# Patient Record
Sex: FEMALE | Race: BLACK OR AFRICAN AMERICAN | Employment: OTHER | URBAN - METROPOLITAN AREA
[De-identification: names, ages, dates, MRNs, and addresses within clinical notes are randomized per-mention and may not be internally consistent; named-entity substitution may affect disease eponyms.]

---

## 2018-02-26 ENCOUNTER — HOSPITAL ENCOUNTER (OUTPATIENT)
Dept: PREADMISSION TESTING | Age: 68
Discharge: HOME | End: 2018-02-26
Payer: MEDICARE

## 2018-02-26 ENCOUNTER — HOSPITAL ENCOUNTER (OUTPATIENT)
Dept: GENERAL RADIOLOGY | Age: 68
Discharge: HOME | End: 2018-02-26
Attending: ORTHOPAEDIC SURGERY
Payer: MEDICARE

## 2018-02-26 DIAGNOSIS — Z01.818 PREOPERATIVE CLEARANCE: ICD-10-CM

## 2018-02-26 LAB
ALBUMIN SERPL-MCNC: 3.7 G/DL (ref 3.4–5)
ALBUMIN/GLOB SERPL: 1 {RATIO} (ref 0.8–1.7)
ALP SERPL-CCNC: 82 U/L (ref 45–117)
ALT SERPL-CCNC: 29 U/L (ref 13–56)
ANION GAP SERPL CALC-SCNC: 10 MMOL/L (ref 3–18)
APPEARANCE UR: CLEAR
AST SERPL-CCNC: 23 U/L (ref 15–37)
BACTERIA SPEC CULT: NORMAL
BILIRUB SERPL-MCNC: 0.6 MG/DL (ref 0.2–1)
BILIRUB UR QL: NEGATIVE
BUN SERPL-MCNC: 33 MG/DL (ref 7–18)
BUN/CREAT SERPL: 21 (ref 12–20)
CALCIUM SERPL-MCNC: 9.9 MG/DL (ref 8.5–10.1)
CHLORIDE SERPL-SCNC: 106 MMOL/L (ref 100–108)
CO2 SERPL-SCNC: 25 MMOL/L (ref 21–32)
COLOR UR: YELLOW
CREAT SERPL-MCNC: 1.56 MG/DL (ref 0.6–1.3)
ERYTHROCYTE [DISTWIDTH] IN BLOOD BY AUTOMATED COUNT: 15.4 % (ref 11.6–14.5)
EST. AVERAGE GLUCOSE BLD GHB EST-MCNC: 140 MG/DL
GLOBULIN SER CALC-MCNC: 3.6 G/DL (ref 2–4)
GLUCOSE SERPL-MCNC: 105 MG/DL (ref 74–99)
GLUCOSE UR STRIP.AUTO-MCNC: NEGATIVE MG/DL
HBA1C MFR BLD: 6.5 % (ref 4.5–5.6)
HCT VFR BLD AUTO: 40 % (ref 35–45)
HGB BLD-MCNC: 12.7 G/DL (ref 12–16)
HGB UR QL STRIP: NEGATIVE
KETONES UR QL STRIP.AUTO: NEGATIVE MG/DL
LEUKOCYTE ESTERASE UR QL STRIP.AUTO: NEGATIVE
MCH RBC QN AUTO: 28.3 PG (ref 24–34)
MCHC RBC AUTO-ENTMCNC: 31.8 G/DL (ref 31–37)
MCV RBC AUTO: 89.3 FL (ref 74–97)
NITRITE UR QL STRIP.AUTO: NEGATIVE
PH UR STRIP: 5 [PH] (ref 5–8)
PLATELET # BLD AUTO: 297 K/UL (ref 135–420)
PMV BLD AUTO: 11 FL (ref 9.2–11.8)
POTASSIUM SERPL-SCNC: 5.3 MMOL/L (ref 3.5–5.5)
PROT SERPL-MCNC: 7.3 G/DL (ref 6.4–8.2)
PROT UR STRIP-MCNC: NEGATIVE MG/DL
RBC # BLD AUTO: 4.48 M/UL (ref 4.2–5.3)
SERVICE CMNT-IMP: NORMAL
SODIUM SERPL-SCNC: 141 MMOL/L (ref 136–145)
SP GR UR REFRACTOMETRY: 1.02 (ref 1–1.03)
UROBILINOGEN UR QL STRIP.AUTO: 1 EU/DL (ref 0.2–1)
WBC # BLD AUTO: 11.7 K/UL (ref 4.6–13.2)

## 2018-02-26 PROCEDURE — 87077 CULTURE AEROBIC IDENTIFY: CPT | Performed by: ORTHOPAEDIC SURGERY

## 2018-02-26 PROCEDURE — 85027 COMPLETE CBC AUTOMATED: CPT | Performed by: ORTHOPAEDIC SURGERY

## 2018-02-26 PROCEDURE — 80053 COMPREHEN METABOLIC PANEL: CPT | Performed by: ORTHOPAEDIC SURGERY

## 2018-02-26 PROCEDURE — 87086 URINE CULTURE/COLONY COUNT: CPT | Performed by: ORTHOPAEDIC SURGERY

## 2018-02-26 PROCEDURE — 87186 SC STD MICRODIL/AGAR DIL: CPT | Performed by: ORTHOPAEDIC SURGERY

## 2018-02-26 PROCEDURE — 83036 HEMOGLOBIN GLYCOSYLATED A1C: CPT | Performed by: ORTHOPAEDIC SURGERY

## 2018-02-26 PROCEDURE — 93005 ELECTROCARDIOGRAM TRACING: CPT

## 2018-02-26 PROCEDURE — 71046 X-RAY EXAM CHEST 2 VIEWS: CPT

## 2018-02-26 PROCEDURE — 87641 MR-STAPH DNA AMP PROBE: CPT | Performed by: ORTHOPAEDIC SURGERY

## 2018-02-26 PROCEDURE — 81003 URINALYSIS AUTO W/O SCOPE: CPT | Performed by: ORTHOPAEDIC SURGERY

## 2018-02-26 PROCEDURE — 36415 COLL VENOUS BLD VENIPUNCTURE: CPT | Performed by: ORTHOPAEDIC SURGERY

## 2018-02-27 LAB
ATRIAL RATE: 84 BPM
CALCULATED P AXIS, ECG09: -4 DEGREES
CALCULATED R AXIS, ECG10: 45 DEGREES
CALCULATED T AXIS, ECG11: 80 DEGREES
DIAGNOSIS, 93000: NORMAL
P-R INTERVAL, ECG05: 144 MS
Q-T INTERVAL, ECG07: 374 MS
QRS DURATION, ECG06: 76 MS
QTC CALCULATION (BEZET), ECG08: 441 MS
VENTRICULAR RATE, ECG03: 84 BPM

## 2018-03-01 LAB
BACTERIA SPEC CULT: ABNORMAL
BACTERIA SPEC CULT: ABNORMAL
SERVICE CMNT-IMP: ABNORMAL

## 2018-03-05 ENCOUNTER — HOSPITAL ENCOUNTER (OUTPATIENT)
Dept: PREADMISSION TESTING | Age: 68
Discharge: HOME | End: 2018-03-05

## 2018-03-05 VITALS — BODY MASS INDEX: 44.1 KG/M2 | HEIGHT: 67 IN | WEIGHT: 281 LBS

## 2018-03-05 RX ORDER — SIMVASTATIN 40 MG/1
40 TABLET, FILM COATED ORAL
COMMUNITY

## 2018-03-05 RX ORDER — COLCHICINE 0.6 MG/1
0.6 TABLET ORAL
COMMUNITY

## 2018-03-05 RX ORDER — ALLOPURINOL 100 MG/1
100 TABLET ORAL 2 TIMES DAILY
COMMUNITY

## 2018-03-05 RX ORDER — GLIPIZIDE 5 MG/1
5 TABLET ORAL DAILY
COMMUNITY

## 2018-03-05 RX ORDER — FUROSEMIDE 20 MG/1
20 TABLET ORAL DAILY
COMMUNITY

## 2018-03-05 RX ORDER — LEVOTHYROXINE SODIUM 200 UG/1
200 TABLET ORAL
COMMUNITY

## 2018-03-05 RX ORDER — ASPIRIN 81 MG/1
81 TABLET ORAL DAILY
COMMUNITY

## 2018-03-05 RX ORDER — ENALAPRIL MALEATE 20 MG/1
20 TABLET ORAL 2 TIMES DAILY
COMMUNITY

## 2018-03-05 RX ORDER — MELATONIN
1000 DAILY
Status: ON HOLD | COMMUNITY
End: 2019-01-21

## 2018-03-05 RX ORDER — CLINDAMYCIN PHOSPHATE 900 MG/50ML
900 INJECTION, SOLUTION INTRAVENOUS ONCE
Status: CANCELLED | OUTPATIENT
Start: 2018-03-05 | End: 2018-03-05

## 2018-03-05 NOTE — PERIOP NOTES
Uses a CPAP and was instructed to bring on admission. Removable- full set of dentures. No or family history of malignant hyperthermia. Care fusion kit and instructions given and reviewed. PCP is aware of the surgery. No participation in clinical trial or research study. Meets criteria for special population. Posting notified. Pt stated she was told to stop aspirin and celebrex 5 days before surgery. Received verbal permission to access Care Everywhere.

## 2018-03-09 ENCOUNTER — ANESTHESIA EVENT (OUTPATIENT)
Dept: SURGERY | Age: 68
DRG: 470 | End: 2018-03-09
Payer: MEDICARE

## 2018-03-12 ENCOUNTER — ANESTHESIA (OUTPATIENT)
Dept: SURGERY | Age: 68
DRG: 470 | End: 2018-03-12
Payer: MEDICARE

## 2018-03-12 ENCOUNTER — HOSPITAL ENCOUNTER (INPATIENT)
Age: 68
LOS: 3 days | Discharge: NURSING, REHAB OR LONG-TERM CARE | DRG: 470 | End: 2018-03-15
Attending: ORTHOPAEDIC SURGERY | Admitting: ORTHOPAEDIC SURGERY
Payer: MEDICARE

## 2018-03-12 ENCOUNTER — APPOINTMENT (OUTPATIENT)
Dept: GENERAL RADIOLOGY | Age: 68
DRG: 470 | End: 2018-03-12
Attending: ORTHOPAEDIC SURGERY
Payer: MEDICARE

## 2018-03-12 DIAGNOSIS — G47.33 OSA (OBSTRUCTIVE SLEEP APNEA): ICD-10-CM

## 2018-03-12 DIAGNOSIS — E66.01 MORBID OBESITY (HCC): ICD-10-CM

## 2018-03-12 DIAGNOSIS — R00.0 TACHYCARDIA: ICD-10-CM

## 2018-03-12 DIAGNOSIS — D62 ACUTE BLOOD LOSS ANEMIA: ICD-10-CM

## 2018-03-12 DIAGNOSIS — M16.11 PRIMARY OSTEOARTHRITIS OF RIGHT HIP: Primary | ICD-10-CM

## 2018-03-12 PROBLEM — Z96.649 S/P HIP REPLACEMENT: Status: ACTIVE | Noted: 2018-03-12

## 2018-03-12 PROBLEM — M16.9 DEGENERATIVE JOINT DISEASE (DJD) OF HIP: Status: ACTIVE | Noted: 2018-03-12

## 2018-03-12 LAB
ANION GAP SERPL CALC-SCNC: 11 MMOL/L (ref 3–18)
BUN SERPL-MCNC: 32 MG/DL (ref 7–18)
BUN/CREAT SERPL: 19 (ref 12–20)
CALCIUM SERPL-MCNC: 8.5 MG/DL (ref 8.5–10.1)
CHLORIDE SERPL-SCNC: 105 MMOL/L (ref 100–108)
CO2 SERPL-SCNC: 21 MMOL/L (ref 21–32)
CREAT SERPL-MCNC: 1.67 MG/DL (ref 0.6–1.3)
EST. AVERAGE GLUCOSE BLD GHB EST-MCNC: 140 MG/DL
GLUCOSE BLD STRIP.AUTO-MCNC: 187 MG/DL (ref 70–110)
GLUCOSE BLD STRIP.AUTO-MCNC: 218 MG/DL (ref 70–110)
GLUCOSE BLD STRIP.AUTO-MCNC: 99 MG/DL (ref 70–110)
GLUCOSE SERPL-MCNC: 192 MG/DL (ref 74–99)
HBA1C MFR BLD: 6.5 % (ref 4.5–5.6)
HCT VFR BLD AUTO: 28.8 % (ref 35–45)
HCT VFR BLD AUTO: 29.7 % (ref 35–45)
HGB BLD-MCNC: 9 G/DL (ref 12–16)
HGB BLD-MCNC: 9 G/DL (ref 12–16)
POTASSIUM SERPL-SCNC: 7 MMOL/L (ref 3.5–5.5)
SODIUM SERPL-SCNC: 137 MMOL/L (ref 136–145)

## 2018-03-12 PROCEDURE — 77030037400 HC ADH TISS HI VISC EXOFIN CHMP -B: Performed by: ORTHOPAEDIC SURGERY

## 2018-03-12 PROCEDURE — 80048 BASIC METABOLIC PNL TOTAL CA: CPT | Performed by: HOSPITALIST

## 2018-03-12 PROCEDURE — C9290 INJ, BUPIVACAINE LIPOSOME: HCPCS | Performed by: ORTHOPAEDIC SURGERY

## 2018-03-12 PROCEDURE — 74011250636 HC RX REV CODE- 250/636: Performed by: ORTHOPAEDIC SURGERY

## 2018-03-12 PROCEDURE — 76010000133 HC OR TIME 3 TO 3.5 HR: Performed by: ORTHOPAEDIC SURGERY

## 2018-03-12 PROCEDURE — 74011636637 HC RX REV CODE- 636/637: Performed by: ANESTHESIOLOGY

## 2018-03-12 PROCEDURE — 77030020262 HC SOL INJ SOD CL 0.9% 100ML: Performed by: ORTHOPAEDIC SURGERY

## 2018-03-12 PROCEDURE — 86920 COMPATIBILITY TEST SPIN: CPT | Performed by: ORTHOPAEDIC SURGERY

## 2018-03-12 PROCEDURE — 74011250636 HC RX REV CODE- 250/636

## 2018-03-12 PROCEDURE — 65270000029 HC RM PRIVATE

## 2018-03-12 PROCEDURE — 77030013079 HC BLNKT BAIR HGGR 3M -A: Performed by: ANESTHESIOLOGY

## 2018-03-12 PROCEDURE — 74011000258 HC RX REV CODE- 258: Performed by: ORTHOPAEDIC SURGERY

## 2018-03-12 PROCEDURE — 77030038010: Performed by: ORTHOPAEDIC SURGERY

## 2018-03-12 PROCEDURE — P9045 ALBUMIN (HUMAN), 5%, 250 ML: HCPCS

## 2018-03-12 PROCEDURE — 74011250636 HC RX REV CODE- 250/636: Performed by: ANESTHESIOLOGY

## 2018-03-12 PROCEDURE — 74011000250 HC RX REV CODE- 250

## 2018-03-12 PROCEDURE — 77030012508 HC MSK AIRWY LMA AMBU -A: Performed by: ANESTHESIOLOGY

## 2018-03-12 PROCEDURE — 0SR904A REPLACEMENT OF RIGHT HIP JOINT WITH CERAMIC ON POLYETHYLENE SYNTHETIC SUBSTITUTE, UNCEMENTED, OPEN APPROACH: ICD-10-PCS | Performed by: ORTHOPAEDIC SURGERY

## 2018-03-12 PROCEDURE — 93005 ELECTROCARDIOGRAM TRACING: CPT

## 2018-03-12 PROCEDURE — 86900 BLOOD TYPING SEROLOGIC ABO: CPT | Performed by: ORTHOPAEDIC SURGERY

## 2018-03-12 PROCEDURE — 77030020255 HC SOL INJ LR 1000ML BG: Performed by: ORTHOPAEDIC SURGERY

## 2018-03-12 PROCEDURE — 76060000037 HC ANESTHESIA 3 TO 3.5 HR: Performed by: ORTHOPAEDIC SURGERY

## 2018-03-12 PROCEDURE — 77030031139 HC SUT VCRL2 J&J -A: Performed by: ORTHOPAEDIC SURGERY

## 2018-03-12 PROCEDURE — 83036 HEMOGLOBIN GLYCOSYLATED A1C: CPT | Performed by: HOSPITALIST

## 2018-03-12 PROCEDURE — 85027 COMPLETE CBC AUTOMATED: CPT | Performed by: HOSPITALIST

## 2018-03-12 PROCEDURE — 77030013708 HC HNDPC SUC IRR PULS STRY –B: Performed by: ORTHOPAEDIC SURGERY

## 2018-03-12 PROCEDURE — 77030011640 HC PAD GRND REM COVD -A: Performed by: ORTHOPAEDIC SURGERY

## 2018-03-12 PROCEDURE — 77030022295 HC SEAL BPLR VSL DISP MEDT -F: Performed by: ORTHOPAEDIC SURGERY

## 2018-03-12 PROCEDURE — 74011250636 HC RX REV CODE- 250/636: Performed by: HOSPITALIST

## 2018-03-12 PROCEDURE — 77030003028 HC SUT VCRL J&J -A: Performed by: ORTHOPAEDIC SURGERY

## 2018-03-12 PROCEDURE — 74011000250 HC RX REV CODE- 250: Performed by: ORTHOPAEDIC SURGERY

## 2018-03-12 PROCEDURE — 76210000019 HC OR PH I REC 4 TO 4.5 HR: Performed by: ORTHOPAEDIC SURGERY

## 2018-03-12 PROCEDURE — 74011250637 HC RX REV CODE- 250/637: Performed by: ANESTHESIOLOGY

## 2018-03-12 PROCEDURE — 77030032490 HC SLV COMPR SCD KNE COVD -B: Performed by: ORTHOPAEDIC SURGERY

## 2018-03-12 PROCEDURE — 77030018846 HC SOL IRR STRL H20 ICUM -A: Performed by: ORTHOPAEDIC SURGERY

## 2018-03-12 PROCEDURE — 77030020782 HC GWN BAIR PAWS FLX 3M -B: Performed by: ORTHOPAEDIC SURGERY

## 2018-03-12 PROCEDURE — 85014 HEMATOCRIT: CPT

## 2018-03-12 PROCEDURE — C1776 JOINT DEVICE (IMPLANTABLE): HCPCS | Performed by: ORTHOPAEDIC SURGERY

## 2018-03-12 PROCEDURE — 77030016154: Performed by: ORTHOPAEDIC SURGERY

## 2018-03-12 PROCEDURE — 74011636637 HC RX REV CODE- 636/637: Performed by: HOSPITALIST

## 2018-03-12 PROCEDURE — 77030005521 HC CATH URETH FOL38 BARD -B: Performed by: ORTHOPAEDIC SURGERY

## 2018-03-12 PROCEDURE — 77030018836 HC SOL IRR NACL ICUM -A: Performed by: ORTHOPAEDIC SURGERY

## 2018-03-12 PROCEDURE — 83735 ASSAY OF MAGNESIUM: CPT | Performed by: HOSPITALIST

## 2018-03-12 PROCEDURE — 77030020269 HC MISC IMPL: Performed by: ORTHOPAEDIC SURGERY

## 2018-03-12 PROCEDURE — 77030008467 HC STPLR SKN COVD -B: Performed by: ORTHOPAEDIC SURGERY

## 2018-03-12 PROCEDURE — 36415 COLL VENOUS BLD VENIPUNCTURE: CPT | Performed by: HOSPITALIST

## 2018-03-12 PROCEDURE — 82962 GLUCOSE BLOOD TEST: CPT

## 2018-03-12 PROCEDURE — 36415 COLL VENOUS BLD VENIPUNCTURE: CPT

## 2018-03-12 PROCEDURE — 77030037875 HC DRSG MEPILEX <16IN BORD MOLN -A: Performed by: ORTHOPAEDIC SURGERY

## 2018-03-12 DEVICE — ELIMINATOR H APEX FOR 48-60MM PINN HIP SHELL: Type: IMPLANTABLE DEVICE | Site: HIP | Status: FUNCTIONAL

## 2018-03-12 DEVICE — COMPONENT TOT HIP PRIMARY CERM ALTRX: Type: IMPLANTABLE DEVICE | Site: HIP | Status: FUNCTIONAL

## 2018-03-12 DEVICE — HEAD FEM DIA36MM +12MM OFFSET 12/14 TAPR HIP CERAMIC BIOLOX: Type: IMPLANTABLE DEVICE | Site: HIP | Status: FUNCTIONAL

## 2018-03-12 DEVICE — SCREW BNE L20MM DIA6.5MM CANC HIP S STL GRIPTION FULL THRD: Type: IMPLANTABLE DEVICE | Site: HIP | Status: FUNCTIONAL

## 2018-03-12 DEVICE — CUP ACET DIA52MM HIP GRIPTION PRI CEMENTLESS FIX SECT SER: Type: IMPLANTABLE DEVICE | Site: HIP | Status: FUNCTIONAL

## 2018-03-12 DEVICE — LINER ACET OD52MM ID36MM HIP ALTRX PINN: Type: IMPLANTABLE DEVICE | Site: HIP | Status: FUNCTIONAL

## 2018-03-12 RX ORDER — FUROSEMIDE 10 MG/ML
40 INJECTION INTRAMUSCULAR; INTRAVENOUS ONCE
Status: COMPLETED | OUTPATIENT
Start: 2018-03-12 | End: 2018-03-12

## 2018-03-12 RX ORDER — SODIUM CHLORIDE 0.9 % (FLUSH) 0.9 %
5-10 SYRINGE (ML) INJECTION AS NEEDED
Status: DISCONTINUED | OUTPATIENT
Start: 2018-03-12 | End: 2018-03-12 | Stop reason: HOSPADM

## 2018-03-12 RX ORDER — INSULIN LISPRO 100 [IU]/ML
INJECTION, SOLUTION INTRAVENOUS; SUBCUTANEOUS
Status: DISCONTINUED | OUTPATIENT
Start: 2018-03-12 | End: 2018-03-15 | Stop reason: HOSPADM

## 2018-03-12 RX ORDER — METOCLOPRAMIDE HYDROCHLORIDE 5 MG/ML
INJECTION INTRAMUSCULAR; INTRAVENOUS AS NEEDED
Status: DISCONTINUED | OUTPATIENT
Start: 2018-03-12 | End: 2018-03-12 | Stop reason: HOSPADM

## 2018-03-12 RX ORDER — ACETAMINOPHEN 10 MG/ML
1000 INJECTION, SOLUTION INTRAVENOUS ONCE
Status: COMPLETED | OUTPATIENT
Start: 2018-03-12 | End: 2018-03-12

## 2018-03-12 RX ORDER — HYDROMORPHONE HYDROCHLORIDE 2 MG/ML
0.5 INJECTION, SOLUTION INTRAMUSCULAR; INTRAVENOUS; SUBCUTANEOUS
Status: DISCONTINUED | OUTPATIENT
Start: 2018-03-12 | End: 2018-03-12 | Stop reason: RX

## 2018-03-12 RX ORDER — HYDROMORPHONE HYDROCHLORIDE 2 MG/ML
INJECTION, SOLUTION INTRAMUSCULAR; INTRAVENOUS; SUBCUTANEOUS AS NEEDED
Status: DISCONTINUED | OUTPATIENT
Start: 2018-03-12 | End: 2018-03-12 | Stop reason: HOSPADM

## 2018-03-12 RX ORDER — CLINDAMYCIN PHOSPHATE 900 MG/50ML
900 INJECTION, SOLUTION INTRAVENOUS ONCE
Status: COMPLETED | OUTPATIENT
Start: 2018-03-12 | End: 2018-03-12

## 2018-03-12 RX ORDER — DEXTROSE 50 % IN WATER (D50W) INTRAVENOUS SYRINGE
25-50 AS NEEDED
Status: DISCONTINUED | OUTPATIENT
Start: 2018-03-12 | End: 2018-03-12 | Stop reason: HOSPADM

## 2018-03-12 RX ORDER — PREGABALIN 50 MG/1
50 CAPSULE ORAL
Status: COMPLETED | OUTPATIENT
Start: 2018-03-12 | End: 2018-03-12

## 2018-03-12 RX ORDER — SODIUM CHLORIDE 9 MG/ML
50 INJECTION, SOLUTION INTRAVENOUS CONTINUOUS
Status: DISCONTINUED | OUTPATIENT
Start: 2018-03-12 | End: 2018-03-15

## 2018-03-12 RX ORDER — PROPOFOL 10 MG/ML
INJECTION, EMULSION INTRAVENOUS AS NEEDED
Status: DISCONTINUED | OUTPATIENT
Start: 2018-03-12 | End: 2018-03-12 | Stop reason: HOSPADM

## 2018-03-12 RX ORDER — INSULIN LISPRO 100 [IU]/ML
INJECTION, SOLUTION INTRAVENOUS; SUBCUTANEOUS ONCE
Status: COMPLETED | OUTPATIENT
Start: 2018-03-12 | End: 2018-03-12

## 2018-03-12 RX ORDER — ALBUMIN HUMAN 50 G/1000ML
SOLUTION INTRAVENOUS AS NEEDED
Status: DISCONTINUED | OUTPATIENT
Start: 2018-03-12 | End: 2018-03-12 | Stop reason: HOSPADM

## 2018-03-12 RX ORDER — MAGNESIUM SULFATE 100 %
4 CRYSTALS MISCELLANEOUS AS NEEDED
Status: DISCONTINUED | OUTPATIENT
Start: 2018-03-12 | End: 2018-03-15 | Stop reason: HOSPADM

## 2018-03-12 RX ORDER — DEXTROSE 50 % IN WATER (D50W) INTRAVENOUS SYRINGE
25-50 AS NEEDED
Status: DISCONTINUED | OUTPATIENT
Start: 2018-03-12 | End: 2018-03-15 | Stop reason: HOSPADM

## 2018-03-12 RX ORDER — HYDROMORPHONE HYDROCHLORIDE 2 MG/ML
0.5 INJECTION, SOLUTION INTRAMUSCULAR; INTRAVENOUS; SUBCUTANEOUS
Status: COMPLETED | OUTPATIENT
Start: 2018-03-12 | End: 2018-03-12

## 2018-03-12 RX ORDER — MIDAZOLAM HYDROCHLORIDE 1 MG/ML
INJECTION, SOLUTION INTRAMUSCULAR; INTRAVENOUS AS NEEDED
Status: DISCONTINUED | OUTPATIENT
Start: 2018-03-12 | End: 2018-03-12 | Stop reason: HOSPADM

## 2018-03-12 RX ORDER — INSULIN GLARGINE 100 [IU]/ML
10 INJECTION, SOLUTION SUBCUTANEOUS
Status: DISCONTINUED | OUTPATIENT
Start: 2018-03-12 | End: 2018-03-15

## 2018-03-12 RX ORDER — ONDANSETRON 2 MG/ML
INJECTION INTRAMUSCULAR; INTRAVENOUS AS NEEDED
Status: DISCONTINUED | OUTPATIENT
Start: 2018-03-12 | End: 2018-03-12 | Stop reason: HOSPADM

## 2018-03-12 RX ORDER — SODIUM CHLORIDE, SODIUM LACTATE, POTASSIUM CHLORIDE, CALCIUM CHLORIDE 600; 310; 30; 20 MG/100ML; MG/100ML; MG/100ML; MG/100ML
125 INJECTION, SOLUTION INTRAVENOUS CONTINUOUS
Status: DISCONTINUED | OUTPATIENT
Start: 2018-03-12 | End: 2018-03-12

## 2018-03-12 RX ORDER — TRANEXAMIC ACID 100 MG/ML
1 INJECTION, SOLUTION INTRAVENOUS
Status: COMPLETED | OUTPATIENT
Start: 2018-03-12 | End: 2018-03-12

## 2018-03-12 RX ORDER — FENTANYL CITRATE 50 UG/ML
INJECTION, SOLUTION INTRAMUSCULAR; INTRAVENOUS AS NEEDED
Status: DISCONTINUED | OUTPATIENT
Start: 2018-03-12 | End: 2018-03-12 | Stop reason: HOSPADM

## 2018-03-12 RX ORDER — LIDOCAINE HYDROCHLORIDE 20 MG/ML
INJECTION, SOLUTION EPIDURAL; INFILTRATION; INTRACAUDAL; PERINEURAL AS NEEDED
Status: DISCONTINUED | OUTPATIENT
Start: 2018-03-12 | End: 2018-03-12 | Stop reason: HOSPADM

## 2018-03-12 RX ORDER — SODIUM CHLORIDE 900 MG/100ML
INJECTION INTRAVENOUS
Status: DISPENSED
Start: 2018-03-12 | End: 2018-03-13

## 2018-03-12 RX ADMIN — FENTANYL CITRATE 25 MCG: 50 INJECTION, SOLUTION INTRAMUSCULAR; INTRAVENOUS at 12:59

## 2018-03-12 RX ADMIN — INSULIN LISPRO 3 UNITS: 100 INJECTION, SOLUTION INTRAVENOUS; SUBCUTANEOUS at 17:06

## 2018-03-12 RX ADMIN — HYDROMORPHONE HYDROCHLORIDE 0.5 MG: 2 INJECTION, SOLUTION INTRAMUSCULAR; INTRAVENOUS; SUBCUTANEOUS at 17:00

## 2018-03-12 RX ADMIN — FENTANYL CITRATE 50 MCG: 50 INJECTION, SOLUTION INTRAMUSCULAR; INTRAVENOUS at 13:04

## 2018-03-12 RX ADMIN — SODIUM CHLORIDE, SODIUM LACTATE, POTASSIUM CHLORIDE, AND CALCIUM CHLORIDE: 600; 310; 30; 20 INJECTION, SOLUTION INTRAVENOUS at 14:10

## 2018-03-12 RX ADMIN — PREGABALIN 50 MG: 50 CAPSULE ORAL at 12:51

## 2018-03-12 RX ADMIN — FENTANYL CITRATE 25 MCG: 50 INJECTION, SOLUTION INTRAMUSCULAR; INTRAVENOUS at 13:47

## 2018-03-12 RX ADMIN — SODIUM CHLORIDE 125 ML/HR: 900 INJECTION, SOLUTION INTRAVENOUS at 22:15

## 2018-03-12 RX ADMIN — SODIUM CHLORIDE 1000 ML: 900 INJECTION, SOLUTION INTRAVENOUS at 20:31

## 2018-03-12 RX ADMIN — FENTANYL CITRATE 25 MCG: 50 INJECTION, SOLUTION INTRAMUSCULAR; INTRAVENOUS at 13:43

## 2018-03-12 RX ADMIN — TRANEXAMIC ACID 1 G: 100 INJECTION, SOLUTION INTRAVENOUS at 13:30

## 2018-03-12 RX ADMIN — HYDROMORPHONE HYDROCHLORIDE 0.5 MG: 2 INJECTION, SOLUTION INTRAMUSCULAR; INTRAVENOUS; SUBCUTANEOUS at 13:54

## 2018-03-12 RX ADMIN — FENTANYL CITRATE 25 MCG: 50 INJECTION, SOLUTION INTRAMUSCULAR; INTRAVENOUS at 13:33

## 2018-03-12 RX ADMIN — ONDANSETRON 4 MG: 2 INJECTION INTRAMUSCULAR; INTRAVENOUS at 13:24

## 2018-03-12 RX ADMIN — INSULIN LISPRO 4 UNITS: 100 INJECTION, SOLUTION INTRAVENOUS; SUBCUTANEOUS at 21:02

## 2018-03-12 RX ADMIN — FENTANYL CITRATE 25 MCG: 50 INJECTION, SOLUTION INTRAMUSCULAR; INTRAVENOUS at 13:19

## 2018-03-12 RX ADMIN — FUROSEMIDE 40 MG: 10 INJECTION, SOLUTION INTRAMUSCULAR; INTRAVENOUS at 20:43

## 2018-03-12 RX ADMIN — LIDOCAINE HYDROCHLORIDE 80 MG: 20 INJECTION, SOLUTION EPIDURAL; INFILTRATION; INTRACAUDAL; PERINEURAL at 12:59

## 2018-03-12 RX ADMIN — ACETAMINOPHEN 1000 MG: 10 INJECTION, SOLUTION INTRAVENOUS at 13:15

## 2018-03-12 RX ADMIN — FENTANYL CITRATE 50 MCG: 50 INJECTION, SOLUTION INTRAMUSCULAR; INTRAVENOUS at 13:28

## 2018-03-12 RX ADMIN — ALBUMIN HUMAN 250 ML: 50 SOLUTION INTRAVENOUS at 14:25

## 2018-03-12 RX ADMIN — FENTANYL CITRATE 25 MCG: 50 INJECTION, SOLUTION INTRAMUSCULAR; INTRAVENOUS at 13:31

## 2018-03-12 RX ADMIN — CLINDAMYCIN PHOSPHATE 900 MG: 900 INJECTION, SOLUTION INTRAVENOUS at 13:05

## 2018-03-12 RX ADMIN — SODIUM CHLORIDE, SODIUM LACTATE, POTASSIUM CHLORIDE, AND CALCIUM CHLORIDE 125 ML/HR: 600; 310; 30; 20 INJECTION, SOLUTION INTRAVENOUS at 16:32

## 2018-03-12 RX ADMIN — HYDROMORPHONE HYDROCHLORIDE 0.5 MG: 2 INJECTION, SOLUTION INTRAMUSCULAR; INTRAVENOUS; SUBCUTANEOUS at 16:47

## 2018-03-12 RX ADMIN — SODIUM CHLORIDE, SODIUM LACTATE, POTASSIUM CHLORIDE, AND CALCIUM CHLORIDE 125 ML/HR: 600; 310; 30; 20 INJECTION, SOLUTION INTRAVENOUS at 12:50

## 2018-03-12 RX ADMIN — HYDROMORPHONE HYDROCHLORIDE 0.5 MG: 2 INJECTION, SOLUTION INTRAMUSCULAR; INTRAVENOUS; SUBCUTANEOUS at 16:54

## 2018-03-12 RX ADMIN — HYDROMORPHONE HYDROCHLORIDE 0.5 MG: 2 INJECTION, SOLUTION INTRAMUSCULAR; INTRAVENOUS; SUBCUTANEOUS at 16:17

## 2018-03-12 RX ADMIN — HYDROMORPHONE HYDROCHLORIDE 0.5 MG: 2 INJECTION, SOLUTION INTRAMUSCULAR; INTRAVENOUS; SUBCUTANEOUS at 17:08

## 2018-03-12 RX ADMIN — ALBUMIN HUMAN 250 ML: 50 SOLUTION INTRAVENOUS at 15:19

## 2018-03-12 RX ADMIN — PROPOFOL 110 MG: 10 INJECTION, EMULSION INTRAVENOUS at 12:59

## 2018-03-12 RX ADMIN — MIDAZOLAM HYDROCHLORIDE 2 MG: 1 INJECTION, SOLUTION INTRAMUSCULAR; INTRAVENOUS at 12:53

## 2018-03-12 RX ADMIN — METOCLOPRAMIDE HYDROCHLORIDE 10 MG: 5 INJECTION INTRAMUSCULAR; INTRAVENOUS at 13:24

## 2018-03-12 RX ADMIN — INSULIN GLARGINE 10 UNITS: 100 INJECTION, SOLUTION SUBCUTANEOUS at 22:37

## 2018-03-12 RX ADMIN — SODIUM CHLORIDE, SODIUM LACTATE, POTASSIUM CHLORIDE, AND CALCIUM CHLORIDE 125 ML/HR: 600; 310; 30; 20 INJECTION, SOLUTION INTRAVENOUS at 17:50

## 2018-03-12 RX ADMIN — HYDROMORPHONE HYDROCHLORIDE 0.5 MG: 2 INJECTION, SOLUTION INTRAMUSCULAR; INTRAVENOUS; SUBCUTANEOUS at 14:02

## 2018-03-12 NOTE — PERIOP NOTES
Dr. Cathy Reed notified about hospitalist consult, she will evaluate patient. Orders received for a stat H&H and EKG.

## 2018-03-12 NOTE — IP AVS SNAPSHOT
[address] [address] 
[phone] Patient: Scott Linton MRN: [id number] BDE:1[birth year]604 About your hospitalization You were admitted on:  March 12, 2018 You last received care in the:  [address] [place] [address] You were discharged on:  March 15, 2018 Why you were hospitalized Your primary diagnosis was:  S/P Hip Replacement Your diagnoses also included:  Degenerative Joint Disease (Djd) Of Hip, Acute Blood Loss Anemia, Tachycardia, Saroj (Obstructive Sleep Apnea), Insulin Dependent Diabetes Mellitus (Hcc), Morbid Obesity (Hcc) Follow-up Information Follow up With Details Comments Contact Info Megan Sadler MD On 3/20/2018 Follow up appointment @ 9:30am [address] [place] [address] 
[phone] Daly Castro MD   [address] [place] [address]  The SNF is responsible for making arrangements for the PCP visit while in house. [facility] [address] [address] 
[phone] Discharge Orders None A check sandrita indicates which time of day the medication should be taken. My Medications START taking these medications Instructions Each Dose to Equal  
 Morning Noon Evening Bedtime  
 acetaminophen 325 mg tablet Commonly known as:  TYLENOL Your last dose was: Your next dose is: Take 2 Tabs by mouth four (4) times daily. 650 mg  
    
   
   
   
  
 enoxaparin 40 mg/0.4 mL Commonly known as:  LOVENOX Your last dose was: Your next dose is: 0.4 mL by SubCUTAneous route daily. 40 mg  
    
   
   
   
  
 oxyCODONE IR 5 mg immediate release tablet Commonly known as:  Kathi Render Your last dose was: Your next dose is: Take 1 Tab by mouth every four (4) hours as needed. Max Daily Amount: 30 mg.  
 5 mg traMADol 50 mg tablet Commonly known as:  ULTRAM  
   
Your last dose was: Your next dose is: Take 1 Tab by mouth four (4) times daily. Max Daily Amount: 200 mg.  
 50 mg CONTINUE taking these medications Instructions Each Dose to Equal  
 Morning Noon Evening Bedtime  
 allopurinol 100 mg tablet Commonly known as:  Vivien Govea Your last dose was: Your next dose is: Take 100 mg by mouth two (2) times a day. Indications: prevention of acute gout attack 100 mg  
    
   
   
   
  
 aspirin delayed-release 81 mg tablet Your last dose was: Your next dose is: Take 81 mg by mouth daily. 81 mg  
    
   
   
   
  
 cholecalciferol 1,000 unit tablet Commonly known as:  VITAMIN D3 Your last dose was: Your next dose is: Take 1,000 Units by mouth daily. 1000 Units  
    
   
   
   
  
 colchicine 0.6 mg tablet Your last dose was: Your next dose is: Take 0.6 mg by mouth two (2) times daily as needed. Indications: prevention of acute gout attack 0.6 mg  
    
   
   
   
  
 enalapril 20 mg tablet Commonly known as:  Mandeep Layne Your last dose was: Your next dose is: Take 20 mg by mouth two (2) times a day. Indications: hypertension 20 mg  
    
   
   
   
  
 furosemide 20 mg tablet Commonly known as:  LASIX Your last dose was: Your next dose is: Take 20 mg by mouth daily. Indications: Edema 20 mg  
    
   
   
   
  
 glipiZIDE 5 mg tablet Commonly known as:  Arnold Hodgkin Your last dose was: Your next dose is: Take 5 mg by mouth daily. Indications: type 2 diabetes mellitus 5 mg  
    
   
   
   
  
 insulin detemir U-100 100 unit/mL (3 mL) Inpn Commonly known as:  Tasha Kraus Your last dose was: Your next dose is: 35 Units by SubCUTAneous route nightly. Indications: type 2 diabetes mellitus 35 Units  
    
   
   
   
  
 levothyroxine 200 mcg tablet Commonly known as:  SYNTHROID Your last dose was: Your next dose is: Take 200 mcg by mouth Daily (before breakfast). Indications: hypothyroidism 200 mcg Liraglutide 0.6 mg/0.1 mL (18 mg/3 mL) Pnij Commonly known as:  Ambrocio Him Your last dose was: Your next dose is:    
   
   
 1.8 mg by SubCUTAneous route daily. Indications: type 2 diabetes mellitus 1.8 mg  
    
   
   
   
  
 simvastatin 40 mg tablet Commonly known as:  ZOCOR Your last dose was: Your next dose is: Take 40 mg by mouth nightly. Indications: hypercholesterolemia 40 mg Where to Get Your Medications Information on where to get these meds will be given to you by the nurse or doctor. ! Ask your nurse or doctor about these medications  
  acetaminophen 325 mg tablet  
 enoxaparin 40 mg/0.4 mL  
 oxyCODONE IR 5 mg immediate release tablet  
 traMADol 50 mg tablet Discharge Instructions DISCHARGE SUMMARY from Nurse PATIENT INSTRUCTIONS: 
 
 
F-face looks uneven A-arms unable to move or move unevenly S-speech slurred or non-existent T-time-call 911 as soon as signs and symptoms begin-DO NOT go Back to bed or wait to see if you get better-TIME IS BRAIN. Warning Signs of HEART ATTACK Call 911 if you have these symptoms: 
? Chest discomfort. Most heart attacks involve discomfort in the center of the chest that lasts more than a few minutes, or that goes away and comes back. It can feel like uncomfortable pressure, squeezing, fullness, or pain. ? Discomfort in other areas of the upper body. Symptoms can include pain or discomfort in one or both arms, the back, neck, jaw, or stomach. ? Shortness of breath with or without chest discomfort. ? Other signs may include breaking out in a cold sweat, nausea, or lightheadedness. Don't wait more than five minutes to call [address]! Fast action can save your life. Calling 911 is almost always the fastest way to get lifesaving treatment. Emergency Medical Services staff can begin treatment when they arrive  up to an hour sooner than if someone gets to the hospital by car. The discharge information has been reviewed with the patient. The patient verbalized understanding. Discharge medications reviewed with the patient and appropriate educational materials and side effects teaching were provided. ___________________________________________________________________________________________________________________________________ Lab Results Component Value Date/Time Hemoglobin A1c 6.6 (H) 03/13/2018 03:22 AM  
 
An A1C of 5.7-6.4% meets the criteria for pre-diabetes; an A1C of 6.5% or higher meets the criteria for diabetes. This lab test reflects that your blood sugar averaged 143 mg/dL  over the past 3 months. It is important to follow up with your provider on a routine basis to continue to evaluate your blood sugar and discuss any necessary changes in treatment. Patient armband removed and shredded MyChart Announcement We are excited to announce that we are making your provider's discharge notes available to you in [employer]. You will see these notes when they are completed and signed by the physician that discharged you from your recent hospital stay.   If you have any questions or concerns about any information you see in [employer], please call the Health Information Department where you were seen or reach out to your Primary Care Provider for more information about your plan of care. Introducing [facility]! Jose Hughes introduces [employer] patient portal. Now you can access parts of your medical record, email your doctor's office, and request medication refills online. 1. In your internet browser, go to [email or web address] 2. Click on the First Time User? Click Here link in the Sign In box. You will see the New Member Sign Up page. 3. Enter your [employer] Access Code exactly as it appears below. You will not need to use this code after youve completed the sign-up process. If you do not sign up before the expiration date, you must request a new code. · [employer] Access Code: [email or web address] Expires: 5/27/2018  1:47 PM 
 
4. Enter the last four digits of your Social Security Number (xxxx) and Date of Birth (mm/dd/yyyy) as indicated and click Submit. You will be taken to the next sign-up page. 5. Create a Wikisway ID. This will be your Wikisway login ID and cannot be changed, so think of one that is secure and easy to remember. 6. Create a [employer] password. You can change your password at any time. 7. Enter your Password Reset Question and Answer. This can be used at a later time if you forget your password. 8. Enter your e-mail address. You will receive e-mail notification when new information is available in [address] 9. Click Sign Up. You can now view and download portions of your medical record. 10. Click the Download Summary menu link to download a portable copy of your medical information. If you have questions, please visit the Frequently Asked Questions section of the [employer] website. Remember, [employer] is NOT to be used for urgent needs. For medical emergencies, dial 911. Now available from your iPhone and Android! Unresulted Labs-Please follow up with your PCP about these lab tests Order Current Status EKG, 12 LEAD, INITIAL Preliminary result URINALYSIS W/MICROSCOPIC Preliminary result Providers Seen During Your Hospitalization Provider Specialty Primary office phone Nahomi Prakash MD Orthopedic Surgery [phone] Your Primary Care Physician (PCP) Primary Care Physician Office Phone Office Fax Marielle Argueta [phone] You are allergic to the following Allergen Reactions Ampicillin Hives Can take PCN Recent Documentation Height Weight BMI OB Status Smoking Status 1.702 m 132.5 kg 45.75 kg/m2 Hysterectomy Former Smoker Emergency Contacts Name Discharge Info Relation Home Work Mobile J Luis Ybarra DISCHARGE CAREGIVER [3] Son [22]   [phone] Patient Belongings The following personal items are in your possession at time of discharge: 
  Dental Appliances: Lowers, Uppers (WITH sonClaudio)  Visual Aid: None          Jewelry: None  Clothing: Pants, Shirt, Footwear, Undergarments, Jacket/Coat (WITH Choco. Raf Ghosh)    Other Valuables:  (son)  Personal Items Sent to Safe:  (cpap at bedside) Please provide this summary of care documentation to your next provider. Signatures-by signing, you are acknowledging that this After Visit Summary has been reviewed with you and you have received a copy. Patient Signature:  ____________________________________________________________ Date:  ____________________________________________________________  
  
Kellie Godfrey Provider Signature:  ____________________________________________________________ Date:  ____________________________________________________________

## 2018-03-12 NOTE — PERIOP NOTES
Family has been updated and given inpatient room #213. Receiving unit Moise Burns) notified that SBAR is available for review.

## 2018-03-12 NOTE — PERIOP NOTES
Notified  patient continues to be sinus tachycardic, patient sleeping comfortably with pain under control. BP's have stabilized, H/H repeat per flowsheet. Aware of EBL. On 4th bag of IVF, no need to void. She said to continue to monitor and continue to give IVF, let IVF infuse with clamp open. Read back and verified. Will continue to monitor.

## 2018-03-12 NOTE — H&P
History and Physical        Patient: Layne Doan               Sex: female          DOA: 3/12/2018         YOB: 1950      Age:  69 y.o.        LOS:  LOS: 0 days        HPI:     Layne Doan is a 69 y.o. female who has right hip DJD has failed non-op therapy    Past Medical History:   Diagnosis Date    Cancer [facility]) 2005    thyroid    Chronic kidney disease     stage 3    Diabetes ([address] 1990's    Gout     Hypercholesterolemia     Hypertension 1990's    Liver disease 1990's    Hep C (cured)    OA (osteoarthritis)     Sleep apnea     uses CPAP    Thyroid disease 2005    cancer       Past Surgical History:   Procedure Laterality Date    HX BREAST LUMPECTOMY Right     X 2    HX HEENT  2005    total thyroidectomy    HX HYSTERECTOMY      BSO    HX KNEE REPLACEMENT Left 2000    HX OPEN CHOLECYSTECTOMY      HX OTHER SURGICAL      hemorrhoidectomy       History reviewed. No pertinent family history. Social History     Social History    Marital status:      Spouse name: N/A    Number of children: N/A    Years of education: N/A     Social History Main Topics    Smoking status: Former Smoker     Quit date: 10/16/2016    Smokeless tobacco: Never Used    Alcohol use Yes      Comment: once every 3 months    Drug use: No    Sexual activity: Not Asked     Other Topics Concern    None     Social History Narrative       Prior to Admission medications    Medication Sig Start Date End Date Taking? Authorizing Provider   allopurinol (ZYLOPRIM) 100 mg tablet Take 100 mg by mouth two (2) times a day. Indications: prevention of acute gout attack   Yes Historical Provider   cholecalciferol (VITAMIN D3) 1,000 unit tablet Take 1,000 Units by mouth daily. Yes Historical Provider   enalapril (VASOTEC) 20 mg tablet Take 20 mg by mouth two (2) times a day. Indications: hypertension   Yes Historical Provider   furosemide (LASIX) 20 mg tablet Take 20 mg by mouth daily.  Indications: Edema Yes Historical Provider   glipiZIDE (GLUCOTROL) 5 mg tablet Take 5 mg by mouth daily. Indications: type 2 diabetes mellitus   Yes Historical Provider   insulin detemir U-100 (LEVEMIR FLEXTOUCH) 100 unit/mL (3 mL) inpn 35 Units by SubCUTAneous route nightly. Indications: type 2 diabetes mellitus   Yes Historical Provider   levothyroxine (SYNTHROID) 200 mcg tablet Take 200 mcg by mouth Daily (before breakfast). Indications: hypothyroidism   Yes Historical Provider   Liraglutide (VICTOZA) 0.6 mg/0.1 mL (18 mg/3 mL) pnij 1.8 mg by SubCUTAneous route daily. Indications: type 2 diabetes mellitus   Yes Historical Provider   simvastatin (ZOCOR) 40 mg tablet Take 40 mg by mouth nightly. Indications: hypercholesterolemia   Yes Historical Provider   aspirin delayed-release 81 mg tablet Take 81 mg by mouth daily. Historical Provider   colchicine 0.6 mg tablet Take 0.6 mg by mouth two (2) times daily as needed. Indications: prevention of acute gout attack    Historical Provider       Allergies   Allergen Reactions    Ampicillin Hives     Can take PCN       Review of Systems  Pertinent items are noted in the History of Present Illness. Physical Exam:      Visit Vitals    /78 (BP 1 Location: Left arm, BP Patient Position: At rest;Pre-activity; Sitting)    Pulse 86    Temp 96.9 °F (36.1 °C)    Resp 16    Ht 5' 7\" (1.702 m)    Wt 128.2 kg (282 lb 9 oz)    SpO2 100%    BMI 44.26 kg/m2       Physical Exam:  Physical Exam:   General:  Alert, cooperative, no distress, appears stated age. Eyes:  Conjunctivae/corneas clear. PERRL, EOMs intact. Fundi benign   Ears:  Normal TMs and external ear canals both ears. Nose: Nares normal. Septum midline. Mucosa normal. No drainage or sinus tenderness. Mouth/Throat: Lips, mucosa, and tongue normal. Teeth and gums normal.   Neck: Supple, symmetrical, trachea midline, no adenopathy, thyroid: no enlargement/tenderness/nodules, no carotid bruit and no JVD.    Back: Symmetric, no curvature. ROM normal. No CVA tenderness. Lungs:   Clear to auscultation bilaterally. Heart:  Regular rate and rhythm, S1, S2 normal, no murmur, click, rub or gallop. Abdomen:   Soft, non-tender. Bowel sounds normal. No masses,  No organomegaly. Extremities: Extremities normal, atraumatic, no cyanosis or edema. Right hip pain with ROM   Pulses: 2+ and symmetric all extremities. Skin: Skin color, texture, turgor normal. No rashes or lesions   Lymph nodes: Cervical, supraclavicular, and axillary nodes normal.   Neurologic: CNII-XII intact. Normal strength, sensation and reflexes throughout. Labs Reviewed: All lab results for the last 24 hours reviewed.     Assessment/Plan     Active Problems:    Degenerative joint disease (DJD) of hip (3/12/2018)        Right THR

## 2018-03-12 NOTE — BRIEF OP NOTE
BRIEF OPERATIVE NOTE    Date of Procedure: 3/12/2018   Preoperative Diagnosis: RIGHT HIP DJD  Postoperative Diagnosis: same   Procedure(s):  RIGHT ANTERIOR HIP REPLACEMENT WITH C-ARM **SPEC POP**   Surgeon(s) and Role:     * Lacy Rodriguez MD - Primary         Assistant Staff: None      Surgical Staff:  Circ-1: Larance Schlatter, RN  Scrub Tech-1: Thi Ortiz  Scrub RN-1: Claudean Catching No case tracking events are documented in the log.   Anesthesia: General   Estimated Blood Loss: 50  Specimens: * No specimens in log *   Findings: djd   Complications: none  Implants: * No implants in log *

## 2018-03-12 NOTE — PERIOP NOTES
Reviewed PTA medication list with patient/caregiver and patient/caregiver denies any additional medications. Patient admits to having a responsible adult care for them for at least 24 hours after surgery. Pt has question about consent- communicated with holding nurse, Zayra Josue. No success after multiple attempts on iV placement- will let anesthesiologist knows.  a

## 2018-03-12 NOTE — CDMP QUERY
Patient is noted to have a BMI of 44.26. Please clarify if this patient is:     =>Morbidly obese (BMI ³ 40)  =>Obese (BMI 30 - 39.9)  =>Overweight (BMI 25 - 29.9)  =>Other explanation of clinical findings  =>Unable to determine (no explanation for clinical findings)    Presentation: 5'7\", 282 lbs = BMI 44.26    REFERENCE:  The [address] [place] [address] has issued a statement indicating that, \"Individuals who are overweight, obese, or morbidly obese are at an increased risk for certain medical conditions when compared to persons of normal weight. Therefore, these conditions are always clinically significant and reportable when documented by the provider. Please clarify and document your clinical opinion in the progress notes and discharge summary, including the definitive and or presumptive diagnosis, (suspected or probable), related to the above clinical findings. Please include clinical findings supporting your diagnosis.      Thank you,  Tiffany Sarabia RN, CCDS

## 2018-03-12 NOTE — PERIOP NOTES
TRANSFER - IN REPORT:    Verbal report received from ORN and CRNA (name) on Aleyda Mini  being received from OR (unit) for routine progression of care      Report consisted of patients Situation, Background, Assessment and   Recommendations(SBAR). Information from the following report(s) SBAR, Kardex, OR Summary, Procedure Summary, Intake/Output, MAR and Recent Results was reviewed with the receiving nurse. Opportunity for questions and clarification was provided. Assessment completed upon patients arrival to unit and care assumed.

## 2018-03-13 LAB
ANION GAP SERPL CALC-SCNC: 9 MMOL/L (ref 3–18)
ANION GAP SERPL CALC-SCNC: 9 MMOL/L (ref 3–18)
BUN SERPL-MCNC: 29 MG/DL (ref 7–18)
BUN SERPL-MCNC: 30 MG/DL (ref 7–18)
BUN/CREAT SERPL: 17 (ref 12–20)
BUN/CREAT SERPL: 17 (ref 12–20)
CALCIUM SERPL-MCNC: 8 MG/DL (ref 8.5–10.1)
CALCIUM SERPL-MCNC: 8.1 MG/DL (ref 8.5–10.1)
CHLORIDE SERPL-SCNC: 106 MMOL/L (ref 100–108)
CHLORIDE SERPL-SCNC: 108 MMOL/L (ref 100–108)
CO2 SERPL-SCNC: 23 MMOL/L (ref 21–32)
CO2 SERPL-SCNC: 25 MMOL/L (ref 21–32)
CREAT SERPL-MCNC: 1.74 MG/DL (ref 0.6–1.3)
CREAT SERPL-MCNC: 1.79 MG/DL (ref 0.6–1.3)
ERYTHROCYTE [DISTWIDTH] IN BLOOD BY AUTOMATED COUNT: 15.6 % (ref 11.6–14.5)
EST. AVERAGE GLUCOSE BLD GHB EST-MCNC: 143 MG/DL
GLUCOSE BLD STRIP.AUTO-MCNC: 149 MG/DL (ref 70–110)
GLUCOSE BLD STRIP.AUTO-MCNC: 167 MG/DL (ref 70–110)
GLUCOSE BLD STRIP.AUTO-MCNC: 201 MG/DL (ref 70–110)
GLUCOSE BLD STRIP.AUTO-MCNC: 262 MG/DL (ref 70–110)
GLUCOSE SERPL-MCNC: 178 MG/DL (ref 74–99)
GLUCOSE SERPL-MCNC: 185 MG/DL (ref 74–99)
HBA1C MFR BLD: 6.6 % (ref 4.5–5.6)
HCT VFR BLD AUTO: 25.7 % (ref 35–45)
HCT VFR BLD AUTO: 26.1 % (ref 35–45)
HCT VFR BLD AUTO: 27.3 % (ref 35–45)
HGB BLD-MCNC: 7.9 G/DL (ref 12–16)
HGB BLD-MCNC: 8.3 G/DL (ref 12–16)
HGB BLD-MCNC: 8.5 G/DL (ref 12–16)
MAGNESIUM SERPL-MCNC: 1.6 MG/DL (ref 1.6–2.6)
MCH RBC QN AUTO: 27.8 PG (ref 24–34)
MCHC RBC AUTO-ENTMCNC: 30.4 G/DL (ref 31–37)
MCV RBC AUTO: 91.3 FL (ref 74–97)
PLATELET # BLD AUTO: 185 K/UL (ref 135–420)
PMV BLD AUTO: 10.1 FL (ref 9.2–11.8)
POTASSIUM SERPL-SCNC: 5.4 MMOL/L (ref 3.5–5.5)
POTASSIUM SERPL-SCNC: 5.7 MMOL/L (ref 3.5–5.5)
RBC # BLD AUTO: 2.99 M/UL (ref 4.2–5.3)
SODIUM SERPL-SCNC: 140 MMOL/L (ref 136–145)
SODIUM SERPL-SCNC: 140 MMOL/L (ref 136–145)
WBC # BLD AUTO: 10.8 K/UL (ref 4.6–13.2)

## 2018-03-13 PROCEDURE — 97530 THERAPEUTIC ACTIVITIES: CPT

## 2018-03-13 PROCEDURE — 97162 PT EVAL MOD COMPLEX 30 MIN: CPT

## 2018-03-13 PROCEDURE — 74011250636 HC RX REV CODE- 250/636: Performed by: HOSPITALIST

## 2018-03-13 PROCEDURE — 74011250636 HC RX REV CODE- 250/636: Performed by: ORTHOPAEDIC SURGERY

## 2018-03-13 PROCEDURE — 65660000000 HC RM CCU STEPDOWN

## 2018-03-13 PROCEDURE — 85018 HEMOGLOBIN: CPT | Performed by: ORTHOPAEDIC SURGERY

## 2018-03-13 PROCEDURE — 83036 HEMOGLOBIN GLYCOSYLATED A1C: CPT | Performed by: ORTHOPAEDIC SURGERY

## 2018-03-13 PROCEDURE — 74011636637 HC RX REV CODE- 636/637: Performed by: HOSPITALIST

## 2018-03-13 PROCEDURE — P9016 RBC LEUKOCYTES REDUCED: HCPCS | Performed by: ORTHOPAEDIC SURGERY

## 2018-03-13 PROCEDURE — 82962 GLUCOSE BLOOD TEST: CPT

## 2018-03-13 PROCEDURE — 30233N1 TRANSFUSION OF NONAUTOLOGOUS RED BLOOD CELLS INTO PERIPHERAL VEIN, PERCUTANEOUS APPROACH: ICD-10-PCS | Performed by: ORTHOPAEDIC SURGERY

## 2018-03-13 PROCEDURE — 74011250637 HC RX REV CODE- 250/637: Performed by: ORTHOPAEDIC SURGERY

## 2018-03-13 PROCEDURE — 74011636637 HC RX REV CODE- 636/637: Performed by: PHYSICIAN ASSISTANT

## 2018-03-13 PROCEDURE — 36430 TRANSFUSION BLD/BLD COMPNT: CPT

## 2018-03-13 PROCEDURE — 97110 THERAPEUTIC EXERCISES: CPT

## 2018-03-13 PROCEDURE — 80048 BASIC METABOLIC PNL TOTAL CA: CPT | Performed by: ORTHOPAEDIC SURGERY

## 2018-03-13 RX ORDER — ENALAPRIL MALEATE 10 MG/1
20 TABLET ORAL 2 TIMES DAILY
Status: DISCONTINUED | OUTPATIENT
Start: 2018-03-13 | End: 2018-03-13

## 2018-03-13 RX ORDER — CLINDAMYCIN PHOSPHATE 900 MG/50ML
900 INJECTION, SOLUTION INTRAVENOUS EVERY 8 HOURS
Status: COMPLETED | OUTPATIENT
Start: 2018-03-13 | End: 2018-03-13

## 2018-03-13 RX ORDER — INSULIN LISPRO 100 [IU]/ML
INJECTION, SOLUTION INTRAVENOUS; SUBCUTANEOUS ONCE
Status: DISCONTINUED | OUTPATIENT
Start: 2018-03-13 | End: 2018-03-13 | Stop reason: SDUPTHER

## 2018-03-13 RX ORDER — SIMVASTATIN 40 MG/1
40 TABLET, FILM COATED ORAL
Status: DISCONTINUED | OUTPATIENT
Start: 2018-03-13 | End: 2018-03-15 | Stop reason: HOSPADM

## 2018-03-13 RX ORDER — INSULIN LISPRO 100 [IU]/ML
INJECTION, SOLUTION INTRAVENOUS; SUBCUTANEOUS
Status: DISCONTINUED | OUTPATIENT
Start: 2018-03-13 | End: 2018-03-13 | Stop reason: SDUPTHER

## 2018-03-13 RX ORDER — DEXTROSE 50 % IN WATER (D50W) INTRAVENOUS SYRINGE
25-50 AS NEEDED
Status: DISCONTINUED | OUTPATIENT
Start: 2018-03-13 | End: 2018-03-15 | Stop reason: HOSPADM

## 2018-03-13 RX ORDER — COLCHICINE 0.6 MG/1
0.6 TABLET ORAL
Status: DISCONTINUED | OUTPATIENT
Start: 2018-03-13 | End: 2018-03-15 | Stop reason: HOSPADM

## 2018-03-13 RX ORDER — NALOXONE HYDROCHLORIDE 0.4 MG/ML
0.4 INJECTION, SOLUTION INTRAMUSCULAR; INTRAVENOUS; SUBCUTANEOUS AS NEEDED
Status: DISCONTINUED | OUTPATIENT
Start: 2018-03-13 | End: 2018-03-15 | Stop reason: HOSPADM

## 2018-03-13 RX ORDER — INSULIN LISPRO 100 [IU]/ML
6 INJECTION, SOLUTION INTRAVENOUS; SUBCUTANEOUS
Status: DISCONTINUED | OUTPATIENT
Start: 2018-03-13 | End: 2018-03-15

## 2018-03-13 RX ORDER — DIPHENHYDRAMINE HCL 25 MG
25 CAPSULE ORAL
Status: DISCONTINUED | OUTPATIENT
Start: 2018-03-13 | End: 2018-03-15 | Stop reason: HOSPADM

## 2018-03-13 RX ORDER — TRAMADOL HYDROCHLORIDE 50 MG/1
50 TABLET ORAL 4 TIMES DAILY
Status: DISCONTINUED | OUTPATIENT
Start: 2018-03-13 | End: 2018-03-15 | Stop reason: HOSPADM

## 2018-03-13 RX ORDER — SODIUM CHLORIDE 9 MG/ML
250 INJECTION, SOLUTION INTRAVENOUS AS NEEDED
Status: DISCONTINUED | OUTPATIENT
Start: 2018-03-13 | End: 2018-03-15 | Stop reason: HOSPADM

## 2018-03-13 RX ORDER — ONDANSETRON 2 MG/ML
4 INJECTION INTRAMUSCULAR; INTRAVENOUS
Status: DISCONTINUED | OUTPATIENT
Start: 2018-03-13 | End: 2018-03-15 | Stop reason: HOSPADM

## 2018-03-13 RX ORDER — ALLOPURINOL 100 MG/1
100 TABLET ORAL 2 TIMES DAILY
Status: DISCONTINUED | OUTPATIENT
Start: 2018-03-13 | End: 2018-03-15 | Stop reason: HOSPADM

## 2018-03-13 RX ORDER — ZOLPIDEM TARTRATE 5 MG/1
5 TABLET ORAL
Status: DISCONTINUED | OUTPATIENT
Start: 2018-03-13 | End: 2018-03-15 | Stop reason: HOSPADM

## 2018-03-13 RX ORDER — SODIUM CHLORIDE, SODIUM LACTATE, POTASSIUM CHLORIDE, CALCIUM CHLORIDE 600; 310; 30; 20 MG/100ML; MG/100ML; MG/100ML; MG/100ML
75 INJECTION, SOLUTION INTRAVENOUS CONTINUOUS
Status: DISCONTINUED | OUTPATIENT
Start: 2018-03-13 | End: 2018-03-13

## 2018-03-13 RX ORDER — MELATONIN
1000 DAILY
Status: DISCONTINUED | OUTPATIENT
Start: 2018-03-13 | End: 2018-03-15 | Stop reason: HOSPADM

## 2018-03-13 RX ORDER — LEVOTHYROXINE SODIUM 200 UG/1
200 TABLET ORAL
Status: DISCONTINUED | OUTPATIENT
Start: 2018-03-13 | End: 2018-03-15 | Stop reason: HOSPADM

## 2018-03-13 RX ORDER — DOCUSATE SODIUM 100 MG/1
100 CAPSULE, LIQUID FILLED ORAL 2 TIMES DAILY
Status: DISCONTINUED | OUTPATIENT
Start: 2018-03-13 | End: 2018-03-15 | Stop reason: HOSPADM

## 2018-03-13 RX ORDER — SODIUM CHLORIDE 0.9 % (FLUSH) 0.9 %
5-10 SYRINGE (ML) INJECTION EVERY 8 HOURS
Status: DISCONTINUED | OUTPATIENT
Start: 2018-03-13 | End: 2018-03-15 | Stop reason: HOSPADM

## 2018-03-13 RX ORDER — HYDROMORPHONE HYDROCHLORIDE 2 MG/ML
1 INJECTION, SOLUTION INTRAMUSCULAR; INTRAVENOUS; SUBCUTANEOUS
Status: DISCONTINUED | OUTPATIENT
Start: 2018-03-13 | End: 2018-03-15 | Stop reason: HOSPADM

## 2018-03-13 RX ORDER — SODIUM CHLORIDE 0.9 % (FLUSH) 0.9 %
5-10 SYRINGE (ML) INJECTION AS NEEDED
Status: DISCONTINUED | OUTPATIENT
Start: 2018-03-13 | End: 2018-03-15 | Stop reason: HOSPADM

## 2018-03-13 RX ORDER — ENOXAPARIN SODIUM 100 MG/ML
40 INJECTION SUBCUTANEOUS DAILY
Status: DISCONTINUED | OUTPATIENT
Start: 2018-03-13 | End: 2018-03-15 | Stop reason: HOSPADM

## 2018-03-13 RX ORDER — FUROSEMIDE 20 MG/1
20 TABLET ORAL DAILY
Status: DISCONTINUED | OUTPATIENT
Start: 2018-03-13 | End: 2018-03-15 | Stop reason: HOSPADM

## 2018-03-13 RX ORDER — ACETAMINOPHEN 325 MG/1
650 TABLET ORAL 4 TIMES DAILY
Status: DISCONTINUED | OUTPATIENT
Start: 2018-03-13 | End: 2018-03-15 | Stop reason: HOSPADM

## 2018-03-13 RX ORDER — OXYCODONE HYDROCHLORIDE 5 MG/1
5 TABLET ORAL
Status: DISCONTINUED | OUTPATIENT
Start: 2018-03-13 | End: 2018-03-15 | Stop reason: HOSPADM

## 2018-03-13 RX ORDER — DIPHENHYDRAMINE HYDROCHLORIDE 50 MG/ML
12.5 INJECTION, SOLUTION INTRAMUSCULAR; INTRAVENOUS
Status: DISCONTINUED | OUTPATIENT
Start: 2018-03-13 | End: 2018-03-15 | Stop reason: HOSPADM

## 2018-03-13 RX ORDER — MAGNESIUM SULFATE 100 %
4 CRYSTALS MISCELLANEOUS AS NEEDED
Status: DISCONTINUED | OUTPATIENT
Start: 2018-03-13 | End: 2018-03-15 | Stop reason: HOSPADM

## 2018-03-13 RX ORDER — KETOROLAC TROMETHAMINE 15 MG/ML
15 INJECTION, SOLUTION INTRAMUSCULAR; INTRAVENOUS EVERY 6 HOURS
Status: DISCONTINUED | OUTPATIENT
Start: 2018-03-13 | End: 2018-03-13

## 2018-03-13 RX ADMIN — ACETAMINOPHEN 650 MG: 325 TABLET ORAL at 08:58

## 2018-03-13 RX ADMIN — INSULIN LISPRO 6 UNITS: 100 INJECTION, SOLUTION INTRAVENOUS; SUBCUTANEOUS at 17:57

## 2018-03-13 RX ADMIN — VITAMIN D, TAB 1000IU (100/BT) 1000 UNITS: 25 TAB at 08:57

## 2018-03-13 RX ADMIN — ENOXAPARIN SODIUM 40 MG: 40 INJECTION SUBCUTANEOUS at 08:59

## 2018-03-13 RX ADMIN — DOCUSATE SODIUM 100 MG: 100 CAPSULE, LIQUID FILLED ORAL at 20:58

## 2018-03-13 RX ADMIN — DOCUSATE SODIUM 100 MG: 100 CAPSULE, LIQUID FILLED ORAL at 00:42

## 2018-03-13 RX ADMIN — Medication 10 ML: at 16:49

## 2018-03-13 RX ADMIN — INSULIN LISPRO 6 UNITS: 100 INJECTION, SOLUTION INTRAVENOUS; SUBCUTANEOUS at 12:21

## 2018-03-13 RX ADMIN — CLINDAMYCIN PHOSPHATE 900 MG: 900 INJECTION, SOLUTION INTRAVENOUS at 12:45

## 2018-03-13 RX ADMIN — SODIUM CHLORIDE 1000 ML: 900 INJECTION, SOLUTION INTRAVENOUS at 06:00

## 2018-03-13 RX ADMIN — DOCUSATE SODIUM 100 MG: 100 CAPSULE, LIQUID FILLED ORAL at 08:59

## 2018-03-13 RX ADMIN — FUROSEMIDE 20 MG: 20 TABLET ORAL at 08:59

## 2018-03-13 RX ADMIN — ACETAMINOPHEN 650 MG: 325 TABLET ORAL at 12:44

## 2018-03-13 RX ADMIN — Medication 10 ML: at 09:01

## 2018-03-13 RX ADMIN — ACETAMINOPHEN 650 MG: 325 TABLET ORAL at 21:01

## 2018-03-13 RX ADMIN — CLINDAMYCIN PHOSPHATE 900 MG: 900 INJECTION, SOLUTION INTRAVENOUS at 02:42

## 2018-03-13 RX ADMIN — TRAMADOL HYDROCHLORIDE 50 MG: 50 TABLET, FILM COATED ORAL at 17:55

## 2018-03-13 RX ADMIN — ENALAPRIL MALEATE 20 MG: 10 TABLET ORAL at 00:43

## 2018-03-13 RX ADMIN — SODIUM CHLORIDE 125 ML/HR: 900 INJECTION, SOLUTION INTRAVENOUS at 16:46

## 2018-03-13 RX ADMIN — ALLOPURINOL 100 MG: 100 TABLET ORAL at 20:58

## 2018-03-13 RX ADMIN — ALLOPURINOL 100 MG: 100 TABLET ORAL at 08:58

## 2018-03-13 RX ADMIN — INSULIN LISPRO 4 UNITS: 100 INJECTION, SOLUTION INTRAVENOUS; SUBCUTANEOUS at 17:56

## 2018-03-13 RX ADMIN — INSULIN GLARGINE 10 UNITS: 100 INJECTION, SOLUTION SUBCUTANEOUS at 23:09

## 2018-03-13 RX ADMIN — TRAMADOL HYDROCHLORIDE 50 MG: 50 TABLET, FILM COATED ORAL at 08:58

## 2018-03-13 RX ADMIN — TRAMADOL HYDROCHLORIDE 50 MG: 50 TABLET, FILM COATED ORAL at 12:44

## 2018-03-13 RX ADMIN — LEVOTHYROXINE SODIUM 200 MCG: 200 TABLET ORAL at 07:50

## 2018-03-13 RX ADMIN — ACETAMINOPHEN 650 MG: 325 TABLET ORAL at 17:55

## 2018-03-13 RX ADMIN — KETOROLAC TROMETHAMINE 15 MG: 15 INJECTION, SOLUTION INTRAMUSCULAR; INTRAVENOUS at 00:42

## 2018-03-13 RX ADMIN — Medication 10 ML: at 14:00

## 2018-03-13 RX ADMIN — SIMVASTATIN 40 MG: 40 TABLET, FILM COATED ORAL at 00:43

## 2018-03-13 RX ADMIN — INSULIN LISPRO 2 UNITS: 100 INJECTION, SOLUTION INTRAVENOUS; SUBCUTANEOUS at 23:08

## 2018-03-13 RX ADMIN — ALLOPURINOL 100 MG: 100 TABLET ORAL at 00:43

## 2018-03-13 RX ADMIN — SIMVASTATIN 40 MG: 40 TABLET, FILM COATED ORAL at 23:08

## 2018-03-13 RX ADMIN — TRAMADOL HYDROCHLORIDE 50 MG: 50 TABLET, FILM COATED ORAL at 23:08

## 2018-03-13 NOTE — ROUTINE PROCESS
TRANSFER - IN REPORT:    Verbal report received from MICAH Moreno(name) on Layne Doan  being received from PACU(unit) for routine progression of care      Report consisted of patients Situation, Background, Assessment and   Recommendations(SBAR). Information from the following report(s) SBAR and Kardex was reviewed with the receiving nurse. Opportunity for questions and clarification was provided. Assessment completed upon patients arrival to unit and care assumed.

## 2018-03-13 NOTE — DIABETES MGMT
GLYCEMIC CONTROL PROGRESS NOTE:    -discussed in rounds, known h/o T2DM, HbA1C within recommended range for age + comorbids, on basal + oral home regimen  -BG out of target range non-ICU: < 140 mg/dL, requiring corrective coverage  -TDD 17 (10 Lantus + 7 - Humalog Normal Insulin Sensitivity Corrective Coverage)  -pt admitted to telemetry for postoperative tachycardia, 2 units of PRBCs to be given today  -GC consult received for diabetes med home management  -recommend monitor BG trends and make adjustments as needed pt may benefit from mealtime insulin    Recent Glucose Results:   Lab Results   Component Value Date/Time     (H) 03/13/2018 03:22 AM     (H) 03/12/2018 11:55 PM     (H) 03/12/2018 08:50 PM    GLUCPOC 149 (H) 03/13/2018 06:15 AM    GLUCPOC 218 (H) 03/12/2018 08:54 PM    GLUCPOC 187 (H) 03/12/2018 04:18 PM             Sana Taylor RN, MS  Glycemic Control Team

## 2018-03-13 NOTE — PROGRESS NOTES
Progress Note      Patient: Bora Sosa               Sex: female          DOA: 3/12/2018         YOB: 1950      Age:  69 y.o.        LOS:  LOS: 1 day               Subjective:   Post-op Day No. 1 Patient alerted, oriented. S/P R THR. Receiving 2 unit PRBC's      Objective:      Vital signs stable. Hgb 7+    Physical Exam:    Neurovascularly intact. Right hip dressing dry. Min swelling      Lab/Data Reviewed    Medications Reviewed    Assessment/Plan   Stable    Physical therapy  Discharge planning;  Check H/H in am

## 2018-03-13 NOTE — CONSULTS
[id number] [facility]    Name:Lawyer Luiz GODFREY  MR#: [id number]  : 1950  ACCOUNT #: [de-identified]   DATE OF SERVICE: 2018    REASON FOR MEDICAL CONSULT:  1. Tachycardia. 2.  Status post hip replacement surgery. 3.  Obstructive sleep apnea. 4.  Morbid obesity. 5.  Insulin-dependent diabetes mellitus. HISTORY OF PRESENT ILLNESS:  This is a 64-year-old lady who has multiple medical issues. She had a right hip replacement today, she had over 1000 mL of blood loss per the anesthesiologist.  I was called by the nurse about this consult and told that she has been tachycardic most of the time postoperatively in the recovery room and she had a repeat hemoglobin and hematocrit at 4:00 p.m. that was 9 and 29.7. She required a number of fluid boluses. Her heart rate seemed to have trended down to a more normal range after that. She does, however, continue to be in the low 100's heart rate range. Blood pressure has not been low, it has been fairly acceptable. She denies any chest pain, shortness of breath, dizziness or lightheadedness. She has moderate pain in the right hip. PAST MEDICAL HISTORY:  Insulin-dependent diabetes, chronic kidney disease stage III, gout, hypertension, hypercholesterolemia, cured hepatitis C, osteoarthritis, sleep apnea on CPAP, thyroid disease, status post thyroid cancer. MEDICATIONS AT HOME: Include allopurinol, aspirin, vitamin D, colchicine, Vasotec, Lasix, Glucotrol, Levemir, Synthroid, Victoza and Zocor. PRIMARY CARE DOCTOR:  Dr. Lv Leyva. ALLERGIES:  SHE HAS AN ALLERGY TO AMPICILLIN. FAMILY HISTORY:  Diabetes, hypertension. SOCIAL HISTORY:  No tobacco or alcohol use. Lives with her family. REVIEW OF SYSTEMS:  GENERAL:  She denies any fevers or chills. CARDIOVASCULAR:  No chest pain, palpitations. RESPIRATORY:  No shortness of breath, wheezing or coughing.     GASTROINTESTINAL:  No nausea, vomiting or diarrhea. MUSCULOSKELETAL:  Pain in the right hip, but controlled. No paresthesias down the legs. HEMATOLOGIC:  She bled quite a bit from her surgery, but no history of bleeding diatheses or low platelet disorder. PHYSICAL EXAMINATION:    GENERAL: The patient is 69years old. She appears as an elderly -American female who is in no acute distress. She is mentating appropriately. She is oriented x3, nontoxic appearing. VITAL SIGNS:  Temperature is 98.9, heart rate is 107, blood pressure 130/59, respiratory rate is 12, SaO2 is 97% on 2 liters. HEENT:  Sclerae are anicteric, conjunctiva pink. NECK:  Supple. No thyromegaly, lymphadenopathy. Oropharynx is dry. LUNGS:  Clear bilaterally. HEART:  Tachycardic, regular rhythm. No murmur, rub or gallop. ABDOMEN:  Soft, nontender, no distention, normal bowel sounds. EXTREMITIES:  Right hip is dressed. Distal pulses are palpable bilaterally. No pitting edema of the ankles or lower extremities. LABORATORY DATA:  Repeat H and H is 9 and 28.8. I am ordering a stat BMP as she has not had one since the end of 02/2018 and she has chronic kidney disease, stage III. I ordered another bolus of normal saline and a dose of Lasix as her urine output had been very limited status post aggressive hydration in the recovery room. ASSESSMENT:  1. Tachycardia postoperatively. 2.  Acute blood loss anemia. 3.  Status post hip replacement. 4.  Chronic kidney disease, stage III. 5.  Obstructive sleep apnea, on CPAP. 6.  Insulin-dependent diabetes mellitus. 7.  Morbid obesity. 8.  Hypertension. 9.  History of thyroid cancer. 10.  Hypothyroidism. 11.  Hypertension. PLAN:  Fluid resuscitation to continue, admission to telemetry for close monitoring. Continue IV normal saline fluids tonight. She has her own CPAP to wear which is appropriate to continue, we will make sure there is an order in place for her to do that.   Repeat CBC and BMP in the morning and then will get a BMP tonight. I have reviewed her home medications, which are appropriate to continue and I agree with what had been ordered postoperatively. We have ordered Accu-Cheks a.c. and at bedtime. She does have basal insulin at home, we will order a lower dose here since we are not sure she if is going to be eating tonight. Monitor her blood sugars. Use correctional scale insulin as necessary. She is to be on a diabetic diet and pain control and antinausea medication as per Surgery, and hopefully the tachycardia is just secondary to her volume loss with anemia and such perioperatively, which the anesthesiologist believes is the case too at this point in time, so we will continue to hydrate. Monitor her vital signs closely in telemetry. Repeat labs in the morning, BMP tonight. Follow her blood sugars and watch closely her urine output. She does have a Tsang catheter in place, so if she is not making urine we need to address that even further also. Thank you very much for the consultation.       Denyse Goldmann, MD [place] / [place]  D: 03/12/2018 21:25     T: 03/12/2018 22:07  JOB #: [id number]

## 2018-03-13 NOTE — OP NOTES
[address] REPORT    Name:Gillian GODFREY  MR#: [id number]  : 1950  ACCOUNT #: [de-identified]   DATE OF SERVICE: 2018    DATE OF SURGERY:  2018. PREOPERATIVE DIAGNOSIS:  Right hip degenerative joint disease. POSTOPERATIVE DIAGNOSIS:   Right hip degenerative joint disease. PROCEDURE PERFORMED:  Right anterior hip replacement. SURGEON:  Mor Pollack MD.    ASSISTANTS:  There were no assistants. ANESTHESIOLOGIST:  Ansley Vernon MD.      ANESTHESIA:  General.    SPECIMEN REMOVED:  There was no specimen. ESTIMATED BLOOD LOSS:  100 mL. COMPLICATIONS:  There were no complications. IMPLANTS:  The implant was a [employer] total hip system, anterior approach. INDICATIONS:  A 56-year-old female with right hip degenerative joint disease. DESCRIPTION:  The patient was brought to the operating room after receiving antibiotics. In the OR,  general anesthesia was administered and a Tsang catheter was placed. The patient was placed on the [place] table with her feet in traction boots. The right hip and thigh were prepped and draped sterilely. A 10 cm incision was made just distal and lateral to the anterior superior iliac spine towards the greater trochanter. After obtaining hemostasis, the fascia over the tensor fascia evelyn was identified. The muscle covering was cut, reflected, and with finger dissection a retractor was placed around the neck of the femur. The rectus femoris was identified and a retractor was placed under this on top of the acetabulum. At this point, additional retractors were placed. The capsule was excised revealing the head and neck. The neck was cut and the head was delivered. The acetabulum was reamed to 51 and a 52 mm DePuy all-porous cup was impacted and verified under the x-ray and image. At this point, it was secured with a single screw and the liner was placed.       Attention was turned to the proximal femur. Proximal femur was ultimately broached to a size 2 and with a size 2 trial and a +8 neck length ball there was stable hip reduction with equality of leg lengths. At this point, the hip was dislocated. The actual short stature Tri-Lock stem was impacted size 2 and then a ceramic ball, a +8 neck length, 36 mm, was placed. Final reduction was carried out and the hip was very stable. At this point, the wound was irrigated. Long-acting local was injected. The defect in the tensor fascia evelyn was repaired with Vicryl. The subcutaneous was closed in two layers of Vicryl and ultimately the skin was stapled. A light dressing was applied and then the patient was transferred to recovery in stable condition.       MD MARIANN Catalan / VIANNEY  D: 03/12/2018 16:14     T: 03/13/2018 00:34  JOB #: [id number]

## 2018-03-13 NOTE — PROGRESS NOTES
Hospitalist Progress Note    Patient: Gaby Constantino MRN: [id number]  CSN: [id number]    YOB: 1950  Age: 69 y.o. Sex: female    DOA: 3/12/2018 LOS:  LOS: 1 day          Chief Complaint:    Consult for Postop Tachycardia    Assessment/Plan     1. Postoperative Tachycardia  2. Acute Blood Loss Anemia  3. S/P Hip Replacement  4. CKD 3  5. ROBERTO on CPAP  6. IDDM  7. Morbid Obesity  8. Hypertension  9. Hx of Thyroid Cancer  10. Hypothyroidism    1. IVF Fluid resuscitation to continue, patient currently receiving 2 units of PRBCs for anemia. Patient appears to be in no distress, BP maintaining well, oxygen saturation also maintaining in the upper 90's-100%. Patient has been afebrile. Continued encouragement of incentive spirometer. Per monitor tech heart rate is sinus tach. Exam in benign. 2. As above, to receive 2 units of PRBCs today. Will continue to monitor H/H.   3. Continue postoperative management, DVT Prophylaxis, PT/OT, d/c planning per primary team.   4. Continue to monitor Cr with daily BMP. 5. Continue her usual CPAP. 6. Continue Lantus 10 units qhs, SSI, diabetic diet, hypoglycemia protocol. 8. Not currently on BP meds. BP stable, continue to monitor. 10. Continue synthroid. Dispo: Per primary team.     Patient Active Problem List   Diagnosis Code    Degenerative joint disease (DJD) of hip M16.9    Acute blood loss anemia D62    Tachycardia R00.0    ROBERTO (obstructive sleep apnea) G47.33    Insulin dependent diabetes mellitus (HCC) E11.9, Z79.4    Morbid obesity (HCC) E66.01    S/P hip replacement Z96.649       Subjective:    No concerns or complaints. Would like to know what blood type she is.     Review of systems:    Constitutional: denies fevers, chills, myalgias  Respiratory: denies SOB, cough  Cardiovascular: denies chest pain, palpitations  Gastrointestinal: denies nausea, vomiting, diarrhea      Vital signs/Intake and Output:  Visit Vitals    BP (!) 104/36 (BP 1 Location: Left arm, BP Patient Position: At rest)    Pulse (!) 112    Temp 99.6 °F (37.6 °C)    Resp 16    Ht 5' 7\" (1.702 m)    Wt 128 kg (282 lb 3 oz)    SpO2 100%    BMI 44.2 kg/m2     Current Shift:  03/13 0701 - 03/13 1900  In: -   Out: 575 [Urine:575]  Last three shifts:  03/11 1901 - 03/13 0700  In: 7012.1 [P.O.:360; I.V.:6652.1]  Out: 1500 [Urine:400]    Exam:    General: Well developed, alert, NAD, OX3  Head/Neck: NCAT, supple, No masses, No lymphadenopathy  CVS:tachycardia, no M/R/G, S1/S2 heard, no thrill  Lungs:Clear to auscultation bilaterally, no wheezes, rhonchi, or rales  Abdomen: Soft, Nontender, No distention, Normal Bowel sounds, No hepatomegaly  Extremities: No C/C/E, pulses palpable 2+. Right hip dressed. Skin:normal texture and turgor, no rashes, no lesions  Neuro:grossly normal , follows commands  Psych:appropriate                Labs: Results:       Chemistry Recent Labs      03/13/18 0322 03/12/18 2355 03/12/18 2050   GLU  185*  178*  192*   NA  140  140  137   K  5.4  5.7*  7.0*   CL  108  106  105   CO2  23  25  21   BUN  29*  30*  32*   CREA  1.74*  1.79*  1.67*   CA  8.0*  8.1*  8.5   AGAP  9  9  11   BUCR  17  17  19      CBC w/Diff Recent Labs      03/13/18 0322 03/12/18 2355 03/12/18 2010   WBC   --   10.8   --    RBC   --   2.99*   --    HGB  7.9*  8.3*  9.0*   HCT  25.7*  27.3*  28.8*   PLT   --   185   --       Cardiac Enzymes No results for input(s): CPK, CKND1, VIOLET in the last 72 hours. No lab exists for component: CKRMB, TROIP   Coagulation No results for input(s): PTP, INR, APTT in the last 72 hours. No lab exists for component: INREXT    Lipid Panel No results found for: CHOL, CHOLPOCT, CHOLX, CHLST, CHOLV, 268163, HDL, LDL, LDLC, DLDLP, 318444, VLDLC, VLDL, TGLX, TRIGL, TRIGP, TGLPOCT, CHHD, CHHDX   BNP No results for input(s): BNPP in the last 72 hours.    Liver Enzymes No results for input(s): TP, ALB, TBIL, AP, SGOT, GPT in the last 72 hours.    No lab exists for component: DBIL   Thyroid Studies No results found for: T4, T3U, TSH, TSHEXT     Procedures/imaging: see electronic medical records for all procedures/Xrays and details which were not copied into this note but were reviewed prior to creation of [address], PATwin[facility]

## 2018-03-13 NOTE — PROGRESS NOTES
2204: Pt. Arrived to unit in bed; Vital signs obtained and stable; No C/o pain or discomfort; Telemetry monitor attach and rhythm verified as SR/ST; Admission database completed; Influenza vaccine received 10/2017; Dressing to R. Hip C/D/I, bilateral peripheral pulse 1+ and Capillary refill <3sec; Education provided regarding the use of IS, Also Pt. Oriented to unit and call bell system, Understanding verbalized and opportunity for question provided; Status stable    2230: Critical Potassium of 7mmol/L called; Dr. Em Burgos made aware, instructed to redraw potassium; order read back and verified     0115: Pt. has Ampicillin allergy and Ancef is ordered, Pre-surgical prophylaxis was Clindamycin (not Ancef); Dr. Yara Beck made aware, instructed to change order to Clindamycin 900mg q8h x 2doses; order read back and verified      03/13/18 0331   Vital Signs   /45  (Pt. received BP meds late)        03/13/18 0455   Vital Signs   BP 98/48   MAP 65     03/13/18 0527   Vital Signs   BP (!) 88/37   MAP (Calculated) (!) 54   Dr. Em Burgos made aware, Instructed to give 1000ml NS bolus and then administer 2units of PRBCs    0625: Received call from LAB that blood is ready, Bolus still infusing    0725: Bedside shift change report given to ROBBY Castro (oncoming nurse) by Shannon Leyva (offgoing nurse). Report included the following information SBAR and Kardex.

## 2018-03-13 NOTE — PROGRESS NOTES
Chart Reviewed. Noted patient admitted to the hospital for further medical management. Care Management to follow up with patient and/or family for transition of care needs prn. Readmission Risk Assessment: Low Risk and [facility] patients    RRAT Score: 1 - 12    Initial Assessment: She had a right hip replacement today, she had over 1000 mL of blood loss per the anesthesiologist.  I was called by the nurse about this consult and told that she has been tachycardic most of the time postoperatively in the recovery room and she had a repeat hemoglobin and hematocrit at 4:00 p.m. that was 9 and 29.7. She required a number of fluid boluses. Her heart rate seemed to have trended down to a more normal range after that. She does, however, continue to be in the low 100's heart rate range. Blood pressure has not been low, it has been fairly acceptable. She denies any chest pain, shortness of breath, dizziness or lightheadedness. She has moderate pain in the right hip. Emergency Contact: see face sheet    Pertinent Medical Hx: Sleepapnea, Morbie obesity DM    PCP/Specialists: Shruthi:     DME:     Low Risk Care Transition Plan:  1. Evaluate for [facility] or [address] coordination of resources  2. Involve patient/caregiver in assessment, planning, education and implement of intervention. 3. CM daily patient care huddles/interdisciplinary rounds. 4. PCP/Specialist appointment within 7 - 10 days made prior to discharge. 5. Facilitate transportation and logistics for follow-up appointments.   6. Handoff to [address] Nurse Navigator or PCP practice

## 2018-03-13 NOTE — PERIOP NOTES
Verbal hand off at the bedside with Luis Galloway RN provided opportunity for questions. Phlebotomist at the bedside to draw BMP and also family member is in the waiting room.

## 2018-03-13 NOTE — PROGRESS NOTES
Hospitalist Progress Note    Patient: Scott Linton MRN: [id number]  CSN: [id number]    YOB: 1950  Age: 69 y.o.   Sex: female    DOA: 3/12/2018 LOS:  LOS: 1 day          Ling Alexander MD     K earlier was 7, repeat in 5 range  Ac blood loss anemia-trend Hgb today-repeat at lunchtime    Stop toradol, vasotec as she has worsened Cr from baseline CKD stage 3-will continue IVF-monitor UOP also closely

## 2018-03-13 NOTE — ANESTHESIA POSTPROCEDURE EVALUATION
Post-Anesthesia Evaluation & Assessment    Visit Vitals    /69    Pulse (!) 109    Temp 37.2 °C (98.9 °F)    Resp 8    Ht 5' 7\" (1.702 m)    Wt 128.2 kg (282 lb 9 oz)    SpO2 97%    BMI 44.26 kg/m2       No untreated/active PONV    Post-operative hydration adequate. Adequate post-operative analgesia per PACU discharge criteria    Mental status & level of consciousness: alert and oriented x 3    Respiratory status: patent unassisted airway     No apparent anesthetic complications requiring additional post-anesthetic care    Patient has met all discharge requirements.             Lita Spangler MD

## 2018-03-13 NOTE — PROGRESS NOTES
1935 Assumed care of patient from ROBBY Lowe RN. Shift assessment initiated. Pain voiced at 0/10, denies the need for pain medication at this time. Ice applied to site. All questions and concerns answered. All needs met. IS encouraged. Bed in the lowest position. Call bell within reach. No further needs at this time. Family at bedside. 2058 Scheduled medications given. Patient voiding at this time. 2258 Scheduled medications given. Pain voiced at 0/10, medicated per MAR. CPAP set up. No further needs at this time. 6053 Scheduled medications given. Ice pack applied to site. No needs at this time. Bedside and Verbal shift change report given to Srinivasa Bah RN (oncoming nurse) by FANNIE Elizalde RN (offgoing nurse). Report included the following information SBAR, Kardex, MAR, Recent Results and Med Rec Status.

## 2018-03-13 NOTE — PERIOP NOTES
TRANSFER - OUT REPORT:    Verbal report given to [address] [place] [address] on Cindy  being transferred to  for routine post - op       Report consisted of patients Situation, Background, Assessment and   Recommendations(SBAR). Information from the following report(s) SBAR, OR Summary, Procedure Summary, Intake/Output and MAR was reviewed with the receiving nurse. Lines:   Peripheral IV 03/12/18 Right; Lower; Outer Forearm (Active)   Site Assessment Clean, dry, & intact 3/12/2018  8:25 PM   Phlebitis Assessment 0 3/12/2018  8:25 PM   Infiltration Assessment 0 3/12/2018  8:25 PM   Dressing Status Clean, dry, & intact 3/12/2018  8:25 PM   Dressing Type Tape;Transparent 3/12/2018  8:25 PM   Hub Color/Line Status Blue; Infusing 3/12/2018  8:25 PM        Opportunity for questions and clarification was provided.       Patient transported with:   O2 @ 2 liters  Registered Nurse  [facility]

## 2018-03-13 NOTE — PROGRESS NOTES
Occupational Therapy Screening:  Services are indicated. Evaluate and Treat Order is requested. An InBasket screening referral was triggered for occupational therapy based on results obtained during the nursing admission assessment. The patients chart was reviewed and the patient is appropriate for a skilled therapy evaluation. Patient admitted for elective R MATEUSZ (Dr. Sia Mcintyre) with anemia & post-op Tachy. Please order a consult for occupational therapy if you are in agreement and would like an evaluation to be completed. Thank you.   Katarina Almanza, OTR/L

## 2018-03-13 NOTE — PROGRESS NOTES
Problem: Mobility Impaired (Adult and Pediatric)  Goal: *Acute Goals and Plan of Care (Insert Text)  Physical Therapy Goals  Initiated 3/13/2018  to be met within 2-3 days  STG's:  1. Bed mobility:  Supine to/from sit with S in prep for ADL activity. 2. Transfers:  Sit to/from stand with S in prep for gait. 3. Standing/Ambulation Balance:  Good with LRAD for safe transfers and gait. LTG's  1. Ambulation:  Ambulate  ft.with S with LRAD for home mobility at discharge. 2. Patient Education:  S/Independent with HEP for home performance accurately at discharge. 3. Step Negotiation: Ascend/Descend 3-5 steps with CGA with HR for home navigation as indicated. Outcome: Progressing Towards Goal  physical Therapy EVALUATION    Patient: Scott Linton (89 y.o. female)  Date: 3/13/2018  Primary Diagnosis: RIGHT HIP DJD  Degenerative joint disease (DJD) of hip  Procedure(s) (LRB):  RIGHT ANTERIOR HIP REPLACEMENT WITH C-ARM **SPEC POP**  (Right) 1 Day Post-Op   Precautions:Fall, WBAT    ASSESSMENT :  Based on the objective data described below, the patient presents with decreased independence in functional mobility with regard to bed mobility, transfers, gait, balance and activity tolerance due to decreased ROM/motor performance R LE following R anterior THR. Patient reports soreness 8/10 pre and 5/10 post treatment. Pt required mod/max assist to R LE during transition to sit EOB, transfers to stand with Steph/bed elevated and able to take several small steps laterally along side of bed with RW/WBAT R LE/Min A, using slow myriam; mildly antalgic. Pt pad soiled with bowel noted upon standing. Hygiene care completed with total assist while pt standing. Pt returned to supine with max assist to R LE and pt left with bed in chair position and in NAD. Pt left completing lunch meal and nurse aware so as to begin next blood transfusion. SCD's, and ice pack in place.    Recommend HHPT vs rehab (pending progress) for follow up physical therapy upon discharge to reach maximal level of independence/safety with functional mobility. Pt Education: pt educated in safety/technique during functional mobility tasks     Patient will benefit from skilled intervention to address the above impairments. Patients rehabilitation potential is considered to be Good  Factors which may influence rehabilitation potential include:   []         None noted  []         Mental ability/status  [x]         Medical condition  [x]         Home/family situation and support systems  []         Safety awareness  []         Pain tolerance/management  []         Other:      PLAN :  Recommendations and Planned Interventions:  [x]           Bed Mobility Training             []    Neuromuscular Re-Education  [x]           Transfer Training                   []    Orthotic/Prosthetic Training  [x]           Gait Training                          []    Modalities  [x]           Therapeutic Exercises          []    Edema Management/Control  [x]           Therapeutic Activities            [x]    Patient and Family Training/Education  []           Other (comment):    Frequency/Duration: Patient will be followed by physical therapy twice daily to address goals. Discharge Recommendations: Rehab and Home Health  Further Equipment Recommendations for Discharge: ? Bariatric rolling walker     SUBJECTIVE:   Patient stated Just a little soreness.     OBJECTIVE DATA SUMMARY:     Past Medical History:   Diagnosis Date    [facility] 2005    thyroid    Chronic kidney disease     stage 3    [facility] 1990's    Gout     Hypercholesterolemia     Hypertension 1990's    Liver disease 1990's    Hep C (cured)    OA (osteoarthritis)     Sleep apnea     uses CPAP    Thyroid disease 2005    cancer     Past Surgical History:   Procedure Laterality Date    HX BREAST LUMPECTOMY Right     X 2    HX HEENT  2005    total thyroidectomy    HX HYSTERECTOMY      BSO    HX KNEE REPLACEMENT Left 2000    HX OPEN CHOLECYSTECTOMY      HX OTHER SURGICAL      hemorrhoidectomy     Barriers to Learning/Limitations: None  Compensate with: N/A  Prior Level of Function/Home Situation: amb with rollator w/o assist PTA  Home Situation  Home Environment: Private residence  # Steps to Enter: 6  Rails to Enter: Yes  Hand Rails : Bilateral  One/Two Story Residence: Two story  Living Alone: No  Support Systems: Family member(s)  Patient Expects to be Discharged to[de-identified] Private residence  Current DME Used/Available at Home: Cane, quad, CPAP, Glucometer, Grab bars, Shower chair, Walker, rollator  Tub or Shower Type: Tub/Shower combination  Critical Behavior:  Neurologic State: Alert; Appropriate for age  Orientation Level: Oriented X4  Cognition: Follows commands; Appropriate decision making; Appropriate for age attention/concentration; Appropriate safety awareness  Safety/Judgement: Awareness of environment; Fall prevention  Psychosocial  Patient Behaviors: Calm; Cooperative  Purposeful Interaction: Yes  Pt Identified Daily Priority: Clinical issues (comment)  Caritas Process: Establish trust;Attend basic human needs;Create healing environment  Caring Interventions: Reassure  Reassure: Therapeutic listening; Informing;Caring rounds  Skin Condition/Temp: Dry;Warm  Skin Integrity: Incision (comment)  Skin Integumentary  Skin Color: Appropriate for ethnicity  Skin Condition/Temp: Dry;Warm  Skin Integrity: Incision (comment)  Hair Growth: Absent  Strength:    Strength: Generally decreased, functional  Tone & Sensation:   Tone: Normal  Sensation: Intact  Range Of Motion:  AROM: Generally decreased, functional  Functional Mobility:  Bed Mobility:  Supine to Sit: Moderate assistance (to manage R LE)  Sit to Supine: Maximum assistance (as above)  Scooting: Contact guard assistance  Transfers:  Sit to Stand: Contact guard assistance (bed elevated)  Stand to Sit: Contact guard assistance (as above)  Balance:   Sitting: Intact  Standing: With support; Impaired  Standing - Static: Good;Constant support  Standing - Dynamic : Fair  Ambulation/Gait Training:  Distance (ft): 3 Feet (ft)  Assistive Device: Gait belt;Walker, rolling  Ambulation - Level of Assistance: Minimal assistance  Gait Description (WDL): Exceptions to WDL  Gait Abnormalities: Antalgic;Decreased step clearance; Step to gait  Right Side Weight Bearing: As tolerated  Base of Support: Widened  Speed/Ewa: Slow;Shuffled  Step Length: Right shortened;Left shortened  Swing Pattern: Right asymmetrical;Left asymmetrical  Interventions: Tactile cues; Safety awareness training;Verbal cues; Visual/Demos  Pain:  Pain Scale 1: Numeric (0 - 10)  Pain Intensity 1: 8/10 soreness pre; 5/10 post soreness   Pain Location 1: Hip  Pain Orientation 1: Right  Pain Description 1: Aching  Pain Intervention(s) 1: Medication (see MAR)  Activity Tolerance:   Fair   Please refer to the flowsheet for vital signs taken during this treatment. After treatment:   []         Patient left in no apparent distress sitting up in chair  [x]         Patient left in no apparent distress in bed  [x]         Call bell left within reach  [x]         Nursing notified  []         Caregiver present  []         Bed alarm activated    COMMUNICATION/EDUCATION:   [x]         Fall prevention education was provided and the patient/caregiver indicated understanding. [x]         Patient/family have participated as able in goal setting and plan of care. [x]         Patient/family agree to work toward stated goals and plan of care. []         Patient understands intent and goals of therapy, but is neutral about his/her participation. []         Patient is unable to participate in goal setting and plan of care.     Eval Complexity: History: HIGH Complexity :3+ comorbidities / personal factors will impact the outcome/ POC Exam:MEDIUM Complexity : 3 Standardized tests and measures addressing body structure, function, activity limitation and / or participation in recreation  Presentation: MEDIUM Complexity : Evolving with changing characteristics  Clinical Decision Making:Medium Complexity amb <30ft Overall Complexity:MEDIUM    G-Codes (GP)  Mobility  [id number] Current  CK= 40-59%   Goal  CI= 1-19%  The severity rating is based on the functional mobility assessment.     Thank you for this referral.  Hernesto Tyler, PT   Time Calculation: 32 mins

## 2018-03-13 NOTE — PROGRESS NOTES
Assumed care of pt. Ptis alert no sign of distress. [id number]: Pt start on blood transfusion. Consent was signed pre op.  Education given, tolerating procedure well,

## 2018-03-13 NOTE — PERIOP NOTES
Dr. Sandrita Kruse notified about decreased urine output, orders received for 1L NS and lasix- see mar.

## 2018-03-13 NOTE — PROGRESS NOTES
Occupational Therapy Evaluation/Treatment Attempt    Chart reviewed. Attempted Occupational Therapy Evaluation/Treatment, however, patient unable to be seen due to:  []  Nausea/vomiting  []  Eating  []  Pain  []  Patient too lethargic  []  Off Unit for testing/procedure  []  Dialysis treatment in progress   []  Telemetry Results  [x]  Other: Patient receiving blood transfusion    Will f/u later as patient's schedule allows.    Thank you for this referral.  Katarina Almanza, OTR/L

## 2018-03-14 PROBLEM — R00.0 TACHYCARDIA: Status: RESOLVED | Noted: 2018-03-12 | Resolved: 2018-03-14

## 2018-03-14 PROBLEM — M16.9 DEGENERATIVE JOINT DISEASE (DJD) OF HIP: Status: RESOLVED | Noted: 2018-03-12 | Resolved: 2018-03-14

## 2018-03-14 LAB
ABO + RH BLD: NORMAL
ANION GAP SERPL CALC-SCNC: 6 MMOL/L (ref 3–18)
BLD PROD TYP BPU: NORMAL
BLD PROD TYP BPU: NORMAL
BLOOD GROUP ANTIBODIES SERPL: NORMAL
BPU ID: NORMAL
BPU ID: NORMAL
BUN SERPL-MCNC: 27 MG/DL (ref 7–18)
BUN/CREAT SERPL: 19 (ref 12–20)
CALCIUM SERPL-MCNC: 7.8 MG/DL (ref 8.5–10.1)
CALLED TO:,BCALL1: NORMAL
CHLORIDE SERPL-SCNC: 107 MMOL/L (ref 100–108)
CO2 SERPL-SCNC: 26 MMOL/L (ref 21–32)
CREAT SERPL-MCNC: 1.39 MG/DL (ref 0.6–1.3)
CROSSMATCH RESULT,%XM: NORMAL
CROSSMATCH RESULT,%XM: NORMAL
ERYTHROCYTE [DISTWIDTH] IN BLOOD BY AUTOMATED COUNT: 15.9 % (ref 11.6–14.5)
GLUCOSE BLD STRIP.AUTO-MCNC: 169 MG/DL (ref 70–110)
GLUCOSE BLD STRIP.AUTO-MCNC: 208 MG/DL (ref 70–110)
GLUCOSE BLD STRIP.AUTO-MCNC: 248 MG/DL (ref 70–110)
GLUCOSE BLD STRIP.AUTO-MCNC: 264 MG/DL (ref 70–110)
GLUCOSE SERPL-MCNC: 129 MG/DL (ref 74–99)
HCT VFR BLD AUTO: 27.7 % (ref 35–45)
HGB BLD-MCNC: 8.7 G/DL (ref 12–16)
MCH RBC QN AUTO: 27.2 PG (ref 24–34)
MCHC RBC AUTO-ENTMCNC: 31.4 G/DL (ref 31–37)
MCV RBC AUTO: 86.6 FL (ref 74–97)
PLATELET # BLD AUTO: 164 K/UL (ref 135–420)
PMV BLD AUTO: 11.3 FL (ref 9.2–11.8)
POTASSIUM SERPL-SCNC: 4.5 MMOL/L (ref 3.5–5.5)
RBC # BLD AUTO: 3.2 M/UL (ref 4.2–5.3)
SODIUM SERPL-SCNC: 139 MMOL/L (ref 136–145)
SPECIMEN EXP DATE BLD: NORMAL
STATUS OF UNIT,%ST: NORMAL
STATUS OF UNIT,%ST: NORMAL
UNIT DIVISION, %UDIV: 0
UNIT DIVISION, %UDIV: 0
WBC # BLD AUTO: 13.7 K/UL (ref 4.6–13.2)

## 2018-03-14 PROCEDURE — 74011636637 HC RX REV CODE- 636/637: Performed by: PHYSICIAN ASSISTANT

## 2018-03-14 PROCEDURE — 97530 THERAPEUTIC ACTIVITIES: CPT

## 2018-03-14 PROCEDURE — 74011636637 HC RX REV CODE- 636/637: Performed by: HOSPITALIST

## 2018-03-14 PROCEDURE — 80048 BASIC METABOLIC PNL TOTAL CA: CPT | Performed by: HOSPITALIST

## 2018-03-14 PROCEDURE — 74011250637 HC RX REV CODE- 250/637: Performed by: ORTHOPAEDIC SURGERY

## 2018-03-14 PROCEDURE — 82962 GLUCOSE BLOOD TEST: CPT

## 2018-03-14 PROCEDURE — 97535 SELF CARE MNGMENT TRAINING: CPT

## 2018-03-14 PROCEDURE — 74011250636 HC RX REV CODE- 250/636: Performed by: HOSPITALIST

## 2018-03-14 PROCEDURE — 97110 THERAPEUTIC EXERCISES: CPT

## 2018-03-14 PROCEDURE — 85027 COMPLETE CBC AUTOMATED: CPT | Performed by: HOSPITALIST

## 2018-03-14 PROCEDURE — 74011250636 HC RX REV CODE- 250/636: Performed by: ORTHOPAEDIC SURGERY

## 2018-03-14 PROCEDURE — 36415 COLL VENOUS BLD VENIPUNCTURE: CPT | Performed by: HOSPITALIST

## 2018-03-14 PROCEDURE — 97116 GAIT TRAINING THERAPY: CPT

## 2018-03-14 PROCEDURE — 97167 OT EVAL HIGH COMPLEX 60 MIN: CPT

## 2018-03-14 PROCEDURE — 65660000000 HC RM CCU STEPDOWN

## 2018-03-14 RX ORDER — ENOXAPARIN SODIUM 100 MG/ML
40 INJECTION SUBCUTANEOUS DAILY
Qty: 21 SYRINGE | Refills: 0 | Status: SHIPPED | OUTPATIENT
Start: 2018-03-15 | End: 2018-08-13

## 2018-03-14 RX ORDER — ACETAMINOPHEN 325 MG/1
650 TABLET ORAL 4 TIMES DAILY
Qty: 120 TAB | Refills: 0 | Status: SHIPPED | OUTPATIENT
Start: 2018-03-14 | End: 2018-08-13

## 2018-03-14 RX ORDER — OXYCODONE HYDROCHLORIDE 5 MG/1
5 TABLET ORAL
Qty: 40 TAB | Refills: 0 | Status: SHIPPED | OUTPATIENT
Start: 2018-03-14 | End: 2018-08-13

## 2018-03-14 RX ORDER — TRAMADOL HYDROCHLORIDE 50 MG/1
50 TABLET ORAL 4 TIMES DAILY
Qty: 60 TAB | Refills: 0 | Status: SHIPPED | OUTPATIENT
Start: 2018-03-14 | End: 2018-08-13

## 2018-03-14 RX ADMIN — TRAMADOL HYDROCHLORIDE 50 MG: 50 TABLET, FILM COATED ORAL at 12:59

## 2018-03-14 RX ADMIN — INSULIN GLARGINE 10 UNITS: 100 INJECTION, SOLUTION SUBCUTANEOUS at 21:11

## 2018-03-14 RX ADMIN — INSULIN LISPRO 2 UNITS: 100 INJECTION, SOLUTION INTRAVENOUS; SUBCUTANEOUS at 08:22

## 2018-03-14 RX ADMIN — TRAMADOL HYDROCHLORIDE 50 MG: 50 TABLET, FILM COATED ORAL at 17:33

## 2018-03-14 RX ADMIN — VITAMIN D, TAB 1000IU (100/BT) 1000 UNITS: 25 TAB at 08:21

## 2018-03-14 RX ADMIN — TRAMADOL HYDROCHLORIDE 50 MG: 50 TABLET, FILM COATED ORAL at 21:05

## 2018-03-14 RX ADMIN — Medication 10 ML: at 17:37

## 2018-03-14 RX ADMIN — INSULIN LISPRO 6 UNITS: 100 INJECTION, SOLUTION INTRAVENOUS; SUBCUTANEOUS at 17:33

## 2018-03-14 RX ADMIN — ENOXAPARIN SODIUM 40 MG: 40 INJECTION SUBCUTANEOUS at 08:20

## 2018-03-14 RX ADMIN — ACETAMINOPHEN 650 MG: 325 TABLET ORAL at 17:33

## 2018-03-14 RX ADMIN — DOCUSATE SODIUM 100 MG: 100 CAPSULE, LIQUID FILLED ORAL at 21:05

## 2018-03-14 RX ADMIN — INSULIN LISPRO 6 UNITS: 100 INJECTION, SOLUTION INTRAVENOUS; SUBCUTANEOUS at 08:22

## 2018-03-14 RX ADMIN — INSULIN LISPRO 4 UNITS: 100 INJECTION, SOLUTION INTRAVENOUS; SUBCUTANEOUS at 12:59

## 2018-03-14 RX ADMIN — ALLOPURINOL 100 MG: 100 TABLET ORAL at 08:21

## 2018-03-14 RX ADMIN — INSULIN LISPRO 3 UNITS: 100 INJECTION, SOLUTION INTRAVENOUS; SUBCUTANEOUS at 17:33

## 2018-03-14 RX ADMIN — ACETAMINOPHEN 650 MG: 325 TABLET ORAL at 08:21

## 2018-03-14 RX ADMIN — INSULIN LISPRO 6 UNITS: 100 INJECTION, SOLUTION INTRAVENOUS; SUBCUTANEOUS at 21:13

## 2018-03-14 RX ADMIN — ACETAMINOPHEN 650 MG: 325 TABLET ORAL at 13:00

## 2018-03-14 RX ADMIN — ACETAMINOPHEN 650 MG: 325 TABLET ORAL at 21:04

## 2018-03-14 RX ADMIN — INSULIN LISPRO 6 UNITS: 100 INJECTION, SOLUTION INTRAVENOUS; SUBCUTANEOUS at 12:59

## 2018-03-14 RX ADMIN — ALLOPURINOL 100 MG: 100 TABLET ORAL at 21:05

## 2018-03-14 RX ADMIN — LEVOTHYROXINE SODIUM 200 MCG: 200 TABLET ORAL at 06:41

## 2018-03-14 RX ADMIN — FUROSEMIDE 20 MG: 20 TABLET ORAL at 08:21

## 2018-03-14 RX ADMIN — TRAMADOL HYDROCHLORIDE 50 MG: 50 TABLET, FILM COATED ORAL at 08:21

## 2018-03-14 RX ADMIN — DOCUSATE SODIUM 100 MG: 100 CAPSULE, LIQUID FILLED ORAL at 08:21

## 2018-03-14 RX ADMIN — SODIUM CHLORIDE 125 ML/HR: 900 INJECTION, SOLUTION INTRAVENOUS at 21:13

## 2018-03-14 RX ADMIN — SIMVASTATIN 40 MG: 40 TABLET, FILM COATED ORAL at 21:05

## 2018-03-14 NOTE — PROGRESS NOTES
Problem: Mobility Impaired (Adult and Pediatric)  Goal: *Acute Goals and Plan of Care (Insert Text)  Physical Therapy Goals  Initiated 3/13/2018  to be met within 2-3 days  STG's:  1. Bed mobility:  Supine to/from sit with S in prep for ADL activity. 2. Transfers:  Sit to/from stand with S in prep for gait. 3. Standing/Ambulation Balance:  Good with LRAD for safe transfers and gait. LTG's  1. Ambulation:  Ambulate  ft.with S with LRAD for home mobility at discharge. 2. Patient Education:  S/Independent with HEP for home performance accurately at discharge. 3. Step Negotiation: Ascend/Descend 3-5 steps with CGA with HR for home navigation as indicated. Outcome: Progressing Towards Goal  physical Therapy TREATMENT    Patient: Min Saleem (1 y.o. female)  Date: 3/13/2018  Diagnosis: RIGHT HIP DJD  Degenerative joint disease (DJD) of hip S/P hip replacement  Procedure(s) (LRB):  RIGHT ANTERIOR HIP REPLACEMENT WITH C-ARM **SPEC POP**  (Right) 2 Days Post-Op  Precautions: Fall, WBAT, Total hip, Skin (Anterior)  Chart, physical therapy assessment, plan of care and goals were reviewed. ASSESSMENT:  Completed Therapeutic ex program as noted and tolerated well. Nurse Grace in to remove tan catheter and notified at completion of session as Olga Primmer to be applied. Will resume GT tomorrow. Progression toward goals:  [x]      Improving appropriately and progressing toward goals  []      Improving slowly and progressing toward goals  []      Not making progress toward goals and plan of care will be adjusted     PLAN:  Patient continues to benefit from skilled intervention to address the above impairments. Continue treatment per established plan of care. Discharge Recommendations:  Rehab vs Home Health  Further Equipment Recommendations for Discharge:  rolling walker     SUBJECTIVE:   Patient stated I feel so much better; I don't have any pain.     OBJECTIVE DATA SUMMARY:   Critical Behavior:  Neurologic State: Alert, Appropriate for age  Orientation Level: Oriented X4  Cognition: Appropriate decision making, Appropriate for age attention/concentration, Appropriate safety awareness, Follows commands  Safety/Judgement: Awareness of environment, Insight into deficits  Therapeutic Exercises: B LE      EXERCISE   Sets   Reps   Active Active Assist   Passive Self ROM   Comments   Ankle Pumps 1 20  [x] [x] [] []    Quad Sets/Glut Sets 1 10 [x] [] [] []    Hamstring Sets   [] [] [] []    Short Arc Quads   [] [] [] []    Heel Slides 1 10 [x] L [x] R [] []    Straight Leg Raises   [] [] [] []    Hip Abd/Add 1 10 [x] L [x] R [] []    Long Arc Quads   [] [] [] []    Seated Marching   [] [] [] []    Standing Marching   [] [] [] []       [] [] [] []      Pain:  Pain Scale 1: Numeric (0 - 10)  Pain Intensity 1: 0  Pain Location 1: Hip  Pain Orientation 1: Right  Pain Description 1: Aching  Pain Intervention(s) 1: Medication (see MAR)  Activity Tolerance:   Fair   Please refer to the flowsheet for vital signs taken during this treatment.   After treatment:   [] Patient left in no apparent distress sitting up in chair  [x] Patient left in no apparent distress in bed  [x] Call bell left within reach  [x] Nursing notified  [] Caregiver present  [] Bed alarm activated      Elpidio Killer, PT   Time Calculation: 15 mins

## 2018-03-14 NOTE — PROGRESS NOTES
Problem: Mobility Impaired (Adult and Pediatric)  Goal: *Acute Goals and Plan of Care (Insert Text)  Physical Therapy Goals  Initiated 3/13/2018  to be met within 2-3 days  STG's:  1. Bed mobility:  Supine to/from sit with S in prep for ADL activity. 2. Transfers:  Sit to/from stand with S in prep for gait. 3. Standing/Ambulation Balance:  Good with LRAD for safe transfers and gait. LTG's  1. Ambulation:  Ambulate  ft.with S with LRAD for home mobility at discharge. 2. Patient Education:  S/Independent with HEP for home performance accurately at discharge. 3. Step Negotiation: Ascend/Descend 3-5 steps with CGA with HR for home navigation as indicated. Outcome: Progressing Towards Goal  physical Therapy TREATMENT    Patient: Alexia Madden (12 y.o. female)  Date: 3/14/2018  Diagnosis: RIGHT HIP DJD  Degenerative joint disease (DJD) of hip S/P hip replacement  Procedure(s) (LRB):  RIGHT ANTERIOR HIP REPLACEMENT WITH C-ARM **SPEC POP**  (Right) 2 Days Post-Op  Precautions: Fall, WBAT, Total hip, Skin (Anterior)   Chart, physical therapy assessment, plan of care and goals were reviewed. ASSESSMENT:  Pt is progressing slowly with a mobility. Difficulty with R LE advance present through 75% of ambulation. Min assist given to stand from toilet. Pt's exercise performance is fair - . Pt will need placement and is accepting of recommendation. Progression toward goals:  []      Improving appropriately and progressing toward goals  [x]      Improving slowly and progressing toward goals  []      Not making progress toward goals and plan of care will be adjusted     PLAN:  Patient continues to benefit from skilled intervention to address the above impairments. Continue treatment per established plan of care.   Discharge Recommendations:  Iván Dominguez  Further Equipment Recommendations for Discharge:  bedside commode, rolling walker and N/A     SUBJECTIVE:   Patient stated I'm ready for you this time. I've started moving my legs .     OBJECTIVE DATA SUMMARY:   Critical Behavior:  Neurologic State: Alert, Appropriate for age  Orientation Level: Oriented X4  Cognition: Appropriate decision making, Appropriate for age attention/concentration, Appropriate safety awareness, Follows commands  Safety/Judgement: Awareness of environment, Insight into deficits  Functional Mobility Training:  Bed Mobility:  Rolling: Contact guard assistance; Additional time  Supine to Sit: Contact guard assistance  Sit to Supine: Contact guard assistance  Scooting: Contact guard assistance  Transfers:  Sit to Stand: Contact guard assistance (Walker counter weight)  Stand to Sit: Contact guard assistance  Balance:  Sitting: Intact  Standing: Intact; With support  Standing - Static: Good  Standing - Dynamic : Fair  Ambulation/Gait Training:  Distance (ft): 30 Feet (ft)  Assistive Device: Gait belt;Walker, rolling  Ambulation - Level of Assistance: Minimal assistance  Gait Abnormalities: Antalgic;Decreased step clearance; Step to gait  Right Side Weight Bearing: As tolerated  Base of Support: Widened  Speed/Ewa: Slow;Shuffled  Step Length: Right shortened;Left shortened  Swing Pattern: Right asymmetrical;Left asymmetrical  Therapeutic Exercises:   HEP in full x 10 min  Pain:  Pain Scale 1: Numeric (0 - 10)  Pain Intensity 1: 0   Pain Out: 0  Activity Tolerance:   Fair  Please refer to the flowsheet for vital signs taken during this treatment.   After treatment:   [x] Patient left in no apparent distress sitting EOB  [] Patient left in no apparent distress in bed  [x] Call bell left within reach  [x] Nursing notified  [] Caregiver present  [] Bed alarm activated      Jannette Del Valle PTA   Time Calculation: 35 mins

## 2018-03-14 NOTE — PROGRESS NOTES
POD2  Alert  VSS  No complaints; urinating without discomfort  R hip dressing intact; no erythema, soft  WBC elevated  Stable  Will repeat WBC; to SNF fri.  Dictated

## 2018-03-14 NOTE — DIABETES MGMT
NUTRITION ASSESSMENT / GLYCEMIC CONTROL PLAN OF CARE     Cindy           69 y.o.              Patient Active Problem List   Diagnosis Code    Degenerative joint disease (DJD) of hip M16.9    Acute blood loss anemia D62    Tachycardia R00.0    ROBERTO (obstructive sleep apnea) G47.33    Insulin dependent diabetes mellitus ([facility] E11.9, Z79.4    Morbid obesity (HCC) E66.01    S/P hip replacement Z96.649        INTERVENTIONS/PLAN:   - BG out of target, known h/o Dm2, on triple therapy Dm regimen at home, well controlled, current A1C 6.6%  - basal/bolus + corrective insulin regimen orders in place, BG trend much improved overnight, recommend continue current doses  - DM Education provided per [facility] [address] and RN (see additional note)  - encouraged OP DM Self Management Class (schedule given)  - recommend d/c pt on PTA DM regimen, follow-up with OP PCM as scheduled    ASSESSMENT:   Nutritional Status: morbid obesity, h/o excessive energy intake, BG out of target r/t DM2       Diabetes Management:     - TDD: 28 units (10 Lantus, 18 Humalog - 6 mealtime, 12 corrective)  - 36 hr BG range: 129 - 262 mg/dl  - hypo: none      Lab Results   Component Value Date/Time     (H) 03/14/2018 02:06 AM    GLUCPOC 169 (H) 03/14/2018 07:05 AM    GLUCPOC 167 (H) 03/13/2018 09:54 PM    GLUCPOC 201 (H) 03/13/2018 04:10 PM             Within target range (non-ICU: <140; ICU<180): [] Yes   [x]  No    Current Insulin regimen:   - Lantus 10 units qhs  - Humalog 6 units qac  - Humalog Normal Insulin Sensitivity Corrective Coverage    Home medication/insulin regimen:   - Levemir 35 units qhs  - Glipizide 5 mg every day  - Victoza 1.8 mg every day    HbA1c: equivalent  to ave BGlucose of 143 mg/dl for 2-3 months prior to admission    Lab Results   Component Value Date/Time    Hemoglobin A1c 6.6 (H) 03/13/2018 03:22 AM       Adequate glycemic control PTA:  [x] Yes  [] No       SUBJECTIVE/OBJECTIVE:   Information obtained from: pt, chart, IDT; pt able to confirm home regimen (see above), reports SMBG at least daily in AM, usually , denies <70 mg/dl, reports has been on current regimen for several years, reports has been trying to focus on diet & that wt loss is a current goal for her, no additional questions at this time, see additional ed note per ARSALAN ISAAC RN        Medications: [x]                Reviewed        Labs:   Lab Results   Component Value Date/Time    Sodium 139 03/14/2018 02:06 AM    Potassium 4.5 03/14/2018 02:06 AM    Chloride 107 03/14/2018 02:06 AM    CO2 26 03/14/2018 02:06 AM    Anion gap 6 03/14/2018 02:06 AM    Glucose 129 (H) 03/14/2018 02:06 AM    BUN 27 (H) 03/14/2018 02:06 AM    Creatinine 1.39 (H) 03/14/2018 02:06 AM    Calcium 7.8 (L) 03/14/2018 02:06 AM    Magnesium 1.6 03/12/2018 11:55 PM    Albumin 3.7 02/26/2018 01:45 PM       Anthropometrics: IBW : 69.9 kg (154 lb), % IBW (Calculated): 183.24 %, BMI (calculated): 44.2  Wt Readings from Last 1 Encounters:   03/14/18 128 kg (282 lb 3 oz)    Ht Readings from Last 1 Encounters:   03/14/18 5' 7\" (1.702 m)       Estimated Nutrition Needs: 1725 Kcals/day (25 kcal/kg of IBW), Protein (g): 82 g (1.2 g/kg) Fluid (ml): 1725 ml (1 ml/kcal)  Based on:   []          Actual BW    [x]          IBW   []            Adjusted BW        Diet:   Active Orders   Diet    DIET DIABETIC CONSISTENT CARB Regular       Intake:   Patient Vitals for the past 100 hrs:   % Diet Eaten   03/14/18 0800 100 %       Nutrition Diagnoses:   - altered nutrition-related lab values r/t Dm AEB A1C of 6.6% and known h/o DM2  - morbid obesity r/t h/o excessive energy intake AEB BMI of 44.2%     Nutrition Interventions:   - education, diet Consistent CHO     Goal: .  - BG will be in target range of  mg/dl (non-ICU) by 3/19/18  - pt will maintain current wt/prevent wt gain by 9/15/18  - pt will follow up with OP PCP for DM by 9/15/18       Nutrition Monitoring and Evaluation      [x]     Monitor po intake on meal rounds  [x]     Continue inpatient monitoring and intervention  [x]     Other: BG      Nutrition Risk:  []   High     []  Moderate    [x]  Minimal/Uncompromised    MELANIE Estrada, MPH, RD, CDE

## 2018-03-14 NOTE — DIABETES MGMT
GLYCEMIC CONTROL PROGRESS NOTE:    -discussed in rounds, known h/o T2DM HbA1C within recommended range for age + comorbids on basal + orals + GLP1 home regimen  -BG out of target range non-ICU: < 140 mg/dL, requiring corrective coverage  -TDD = 22 (10 Lantus + 12 - Humalog Normal Insulin Sensitivity Corrective Coverage)  -PPG out of target range recommend Humalog 6 units qac (orders entered per protocol)    Recent Glucose Results:   Lab Results   Component Value Date/Time     (H) 03/14/2018 02:06 AM    GLUCPOC 248 (H) 03/14/2018 11:20 AM    GLUCPOC 169 (H) 03/14/2018 07:05 AM    GLUCPOC 167 (H) 03/13/2018 09:54 PM          Marya Sarabia RN, MS  Glycemic Control Team

## 2018-03-14 NOTE — PROGRESS NOTES
D/c plan: Anticipate rehab  Met with patient she informed cm that she is willing to go to rehab. She states she only has 2 selected. [address], [place]. And [place] [facility] [address], [place]. Cms will place referrals  Care Management Interventions  PCP Verified by CM:  Yes  Transition of Care Consult (CM Consult): SNF  Partner SNF: Yes  Physical Therapy Consult: Yes  Occupational Therapy Consult: Yes  Current Support Network: Relative's Home  Confirm Follow Up Transport: Family  Plan discussed with Pt/Family/Caregiver: Yes  Freedom of Choice Offered: Yes  Discharge Location  Discharge Placement: Skilled nursing facility

## 2018-03-14 NOTE — DIABETES MGMT
Diabetes Patient/Family Education Record    Factors That  May Influence Patients Ability  to Learn or  Comply with Recommendations   []   Language barrier    []   Cultural needs   []   Motivation    []   Cognitive limitation    []   Physical   []   Education    []   Physiological factors   []   Hearing/vision/speaking impairment   []   Adventist beliefs    []   Financial factors   []  Other:   [x]  No factors identified at this time.      Person Instructed:   [x]   Patient   []   Family   []  Other     Preference for Learning:   [x]   Verbal   []   Written   []  Demonstration     Level of Comprehension & Competence:    [x]  Good                                      [] Fair                                     []  Poor                             []  Needs Reinforcement   [x]  Teachback completed    Education Component:   [x]  Medication management, including confirmation of home regimen, medication education    [x]  Nutritional management; pt reports she is watching her intake CHO   []  Exercise   []  Signs, symptoms, and treatment of hyperglycemia and hypoglycemia   [x] Prevention, recognition and treatment of hypoglycemia; pt denies   [x]  Importance of blood glucose monitoring; SMBG daily    []  Instruction on use of the blood glucose meter   [x]  Discuss the importance of HbA1C monitoring    []  Sick day guidelines   []  Proper use and disposal of lancets, needles, syringes or insulin pens (if appropriate)   []  Potential long-term complications (retinopathy, kidney disease, neuropathy, foot care)   [] Information about whom to contact in case of emergency or for more information    [x]  Goal:  Patient/family will demonstrate understanding of Diabetes Self Management Skills by: 4/30/18  Plan for post-discharge education or self-management support:    [x] Outpatient class schedule provided            [] Patient Declined    [] Scheduled for outpatient classes (date) _______

## 2018-03-14 NOTE — PROGRESS NOTES
[id number] Assumed patient care from off going nurse FANNIE Escobar RN. Patient is alert x 4 resting in bed and appears to be in no sign of distress. Bed left in lowest position with call bell left within reach.

## 2018-03-14 NOTE — ROUTINE PROCESS
Bedside shift change report given to [address] [place] [address],[address] (oncoming nurse) by Faith Louise RN (offgoing nurse). Report included the following information SBAR, Kardex and MAR.

## 2018-03-14 NOTE — PROGRESS NOTES
Problem: Mobility Impaired (Adult and Pediatric)  Goal: *Acute Goals and Plan of Care (Insert Text)  Physical Therapy Goals  Initiated 3/13/2018  to be met within 2-3 days  STG's:  1. Bed mobility:  Supine to/from sit with S in prep for ADL activity. 2. Transfers:  Sit to/from stand with S in prep for gait. 3. Standing/Ambulation Balance:  Good with LRAD for safe transfers and gait. LTG's  1. Ambulation:  Ambulate  ft.with S with LRAD for home mobility at discharge. 2. Patient Education:  S/Independent with HEP for home performance accurately at discharge. 3. Step Negotiation: Ascend/Descend 3-5 steps with CGA with HR for home navigation as indicated. Outcome: Not Progressing Towards Goal  Pt refused PT due to:  []  Nausea/vomiting  []  Eating  []  Pain  [x]  Pt lethargic (fatigued post OT)  []  Off Unit  Will f/u in early PM, as pt's schedule allows. Thank you.   Floridalma Rosenthal, PTA

## 2018-03-14 NOTE — PROGRESS NOTES
Problem: Mobility Impaired (Adult and Pediatric)  Goal: *Acute Goals and Plan of Care (Insert Text)  Physical Therapy Goals  Initiated 3/13/2018  to be met within 2-3 days  STG's:  1. Bed mobility:  Supine to/from sit with S in prep for ADL activity. 2. Transfers:  Sit to/from stand with S in prep for gait. 3. Standing/Ambulation Balance:  Good with LRAD for safe transfers and gait. LTG's  1. Ambulation:  Ambulate  ft.with S with LRAD for home mobility at discharge. 2. Patient Education:  S/Independent with HEP for home performance accurately at discharge. 3. Step Negotiation: Ascend/Descend 3-5 steps with CGA with HR for home navigation as indicated. Outcome: Progressing Towards Goal  physical Therapy TREATMENT    Patient: Yvonne Wilkins (14 y.o. female)  Date: 3/14/2018  Diagnosis: RIGHT HIP DJD  Degenerative joint disease (DJD) of hip S/P hip replacement  Procedure(s) (LRB):  RIGHT ANTERIOR HIP REPLACEMENT WITH C-ARM **SPEC POP**  (Right) 2 Days Post-Op  Precautions: Fall, WBAT, Total hip, Skin (Anterior)   Chart, physical therapy assessment, plan of care and goals were reviewed. ASSESSMENT:  Pt continues to report soreness, but no pain with today's activity. Amb distance increasing with better R LE advancing. Recommending place at D/c to improve independence and decrease RW support, before return to home. Progression toward goals:  []      Improving appropriately and progressing toward goals  [x]      Improving slowly and progressing toward goals  []      Not making progress toward goals and plan of care will be adjusted     PLAN:  Patient continues to benefit from skilled intervention to address the above impairments. Continue treatment per established plan of care.   Discharge Recommendations:  Iván Dominguez  Further Equipment Recommendations for Discharge:  bedside commode and rolling walker     SUBJECTIVE:   Patient stated I want to be able to walk with my granddaughter.     OBJECTIVE DATA SUMMARY:   Critical Behavior:  Neurologic State: Alert, Appropriate for age  Orientation Level: Oriented X4  Cognition: Appropriate decision making, Appropriate for age attention/concentration, Appropriate safety awareness, Follows commands  Safety/Judgement: Awareness of environment, Insight into deficits  Functional Mobility Training:  Bed Mobility:   Rolling: Minimum assistance   Supine to Sit: Minimum assistance   Sit to Supine: Moderate assistance   Scooting: Contact guard assistance  Transfers:  Sit to Stand: Contact guard assistance  Stand to Sit: Contact guard assistance  Balance:  Sitting: Intact  Standing: Intact; With support  Standing - Static: Good  Standing - Dynamic : Fair  Ambulation/Gait Training:  Distance (ft): 70 Feet (ft)  Assistive Device: Gait belt;Walker, rolling  Ambulation - Level of Assistance: Stand-by assistance;Contact guard assistance  Gait Abnormalities: Antalgic;Decreased step clearance; Step to gait  Right Side Weight Bearing: As tolerated  Base of Support: Widened  Speed/Ewa: Slow;Shuffled  Step Length: Right shortened;Left shortened  Swing Pattern: Right asymmetrical;Left asymmetrical  Therapeutic Exercises:   Review   Pain:  Pain Scale 1: Numeric (0 - 10)  Pain Intensity 1: 0   Pain Out: 0  Activity Tolerance:   Fair+  Please refer to the flowsheet for vital signs taken during this treatment.   After treatment:   [] Patient left in no apparent distress sitting up in chair  [x] Patient left in no apparent distress in bed  [x] Call bell left within reach  [x] Nursing notified  [] Caregiver present  [] Bed alarm activated      Sheela Baumann PTA   Time Calculation: 33 mins

## 2018-03-14 NOTE — PROGRESS NOTES
Hospitalist Progress Note    Patient: Liss Navarro MRN: [id number]  CSN: [id number]    YOB: 1950  Age: 69 y.o. Sex: female    DOA: 3/12/2018 LOS:  LOS: 2 days          Chief Complaint:    Consult for Postoperative Tachycardia    Assessment/Plan     1. Postoperative Tachycardia  2. Acute Blood Loss Anemia  3. S/P Hip Replacement  4. CKD 3  5. ROBERTO on CPAP  6. IDDM  7. Morbid Obesity  8. Hypertension  9. Hx of Thyroid Cancer  10. Hypothyroidism    1. Continue IVF hydration. Patient received 2 units PRBCs yesterday. In review of vitals, no tachycardia overnight. Patient has no complaints this AM. Lungs are clear; denies pain at OP site, only soreness. Exam is benign. BP maintaining well. 2. As above, received 2 units PRBCs. H/H 8.7/27.7, will continue to monitor with daily CBC. 3. Continue routine postoperative management, pain control, PT/OT, d/c planning per primary team.   4. Cr stable, will continue to monitor with BMP. 5. Continue home CPAP. 6. Continue 10 units Lantus qhs, Humalog 6 units TIDAC, SSI, diabetic diet, hypoglycemia protocol. 8. BP stable. 10. Continue Synthroid. DVT Prophylaxis - Lovenox  Dispo: Per primary team.    Patient Active Problem List   Diagnosis Code    Degenerative joint disease (DJD) of hip M16.9    Acute blood loss anemia D62    Tachycardia R00.0    ROBERTO (obstructive sleep apnea) G47.33    Insulin dependent diabetes mellitus (HCC) E11.9, Z79.4    Morbid obesity (HCC) E66.01    S/P hip replacement Z96.649       Subjective:    No concerns or complaints. Doing exercises given by PT in bed. Ok with going to rehab.     Review of systems:    Constitutional: denies fevers, chills, myalgias  Respiratory: denies SOB, cough  Cardiovascular: denies chest pain, palpitations  Gastrointestinal: denies nausea, vomiting, diarrhea      Vital signs/Intake and Output:  Visit Vitals    /65 (BP 1 Location: Left arm, BP Patient Position: At rest)    Pulse 96  Temp 98.7 °F (37.1 °C)    Resp 18    Ht 5' 7\" (1.702 m)    Wt 128 kg (282 lb 3 oz)    SpO2 99%    BMI 44.2 kg/m2     Current Shift:  03/14 0701 - 03/14 1900  In: 240 [P.O.:240]  Out: 700 [Urine:700]  Last three shifts:  03/12 1901 - 03/14 0700  In: 5262.1 [P.O.:360; I.V.:4902.1]  Out: 2800 [Urine:2800]    Exam:    General: Obese female, alert, NAD, OX3  Head/Neck: NCAT, supple, No masses, No lymphadenopathy  CVS:Regular rate and rhythm, no M/R/G, S1/S2 heard, no thrill  Lungs:Clear to auscultation bilaterally, no wheezes, rhonchi, or rales  Abdomen: Soft, Nontender, No distention, Normal Bowel sounds, No hepatomegaly  Extremities: Right hip dressed  Skin:normal texture and turgor, no rashes, no lesions  Neuro:grossly normal , follows commands  Psych:appropriate                Labs: Results:       Chemistry Recent Labs      03/14/18   0206  03/13/18   0322 03/12/18   2355   GLU  129*  185*  178*   NA  139  140  140   K  4.5  5.4  5.7*   CL  107  108  106   CO2  26  23  25   BUN  27*  29*  30*   CREA  1.39*  1.74*  1.79*   CA  7.8*  8.0*  8.1*   AGAP  6  9  9   BUCR  19  17  17      CBC w/Diff Recent Labs      03/14/18   0206  03/13/18   1914  03/13/18   0322  03/12/18   2355   WBC  13.7*   --    --   10.8   RBC  3.20*   --    --   2.99*   HGB  8.7*  8.5*  7.9*  8.3*   HCT  27.7*  26.1*  25.7*  27.3*   PLT  164   --    --   185      Cardiac Enzymes No results for input(s): CPK, CKND1, VIOLET in the last 72 hours. No lab exists for component: CKRMB, TROIP   Coagulation No results for input(s): PTP, INR, APTT in the last 72 hours. No lab exists for component: INREXT    Lipid Panel No results found for: CHOL, CHOLPOCT, CHOLX, CHLST, CHOLV, 948429, HDL, LDL, LDLC, DLDLP, 917351, VLDLC, VLDL, TGLX, TRIGL, TRIGP, TGLPOCT, CHHD, CHHDX   BNP No results for input(s): BNPP in the last 72 hours. Liver Enzymes No results for input(s): TP, ALB, TBIL, AP, SGOT, GPT in the last 72 hours.     No lab exists for component: DBIL   Thyroid Studies No results found for: T4, T3U, TSH, TSHEXT     Procedures/imaging: see electronic medical records for all procedures/Xrays and details which were not copied into this note but were reviewed prior to creation of [address], PA-C

## 2018-03-14 NOTE — PROGRESS NOTES
Problem: Self Care Deficits Care Plan (Adult)  Goal: *Acute Goals and Plan of Care (Insert Text)  Initial Occupational Therapy Goals (3/14/2018) Within 7 day(s):    1. Patient will perform grooming standing sinkside with SBA/CGA for increased independence in ADLs. 2. Patient will perform UB dressing with setup seated EOB for increased independence with ADLs. 3. Patient will perform LB dressing with setup & A/E PRN for increased independence with ADLs. 4. Patient will perform all aspects of toileting with SBA/CGA for increased independence with ADLs. 5. Patient will perform LB ADLs utilizing body mechanics & adaptive strategies with 1 verbal cue for increased safety in ADLs. 6. Patient will independently apply energy conservation techniques with 1 verbal cue for increased independence with ADLs. Outcome: Progressing Towards Goal  Occupational Therapy EVALUATION    Patient: Kaci Carreon (73 y.o. female)  Date: 3/14/2018  Primary Diagnosis: RIGHT HIP DJD  Degenerative joint disease (DJD) of hip  Procedure(s) (LRB):  RIGHT ANTERIOR HIP REPLACEMENT WITH C-ARM **SPEC POP**  (Right) 2 Days Post-Op   Precautions:  Fall, WBAT, Total hip, Skin (Anterior)    ASSESSMENT :  Based on the objective data described below, the patient presents with decreased functional strength, decreased functional balance, decreased overall activity tolerance limiting independence with ADLs. Patient highly motivated. Despite pain, pt willing to attempt OOB. Pt able to walk alongside bed and around furniture in room. Pt had urge for toileting and assisted to [facility]. Pt requiring assist for toileting d/t decreased standing balance to perform standing and hygiene. Pt assisted back to bed w/ cues for sequencing and task. Pt would benefit from continued OT services to maximize independence in ADLs and mobility.     Education: Reviewed home safety, body mechanics, importance of moving every hour to prevent joint stiffness, role of ice for edema/pain control, Rolling Walker management/safety, and adaptive dressing techniques with patient verbalizing understanding at this time     Patient will benefit from skilled intervention to address the above impairments. Patients rehabilitation potential is considered to be Good  Factors which may influence rehabilitation potential include:   []             None noted  []             Mental ability/status  [x]             Medical condition  []             Home/family situation and support systems  []             Safety awareness  [x]             Pain tolerance/management  []             Other:      PLAN :  Recommendations and Planned Interventions:  [x]               Self Care Training                  [x]        Therapeutic Activities  [x]               Functional Mobility Training    [x]        Cognitive Retraining  [x]               Therapeutic Exercises           [x]        Endurance Activities  [x]               Balance Training                   [x]        Neuromuscular Re-Education  []               Visual/Perceptual Training     [x]   Home Safety Training  [x]               Patient Education                 [x]        Family Training/Education  []               Other (comment):    Frequency/Duration: Patient will be followed by occupational therapy 1-2 times per day/4-7 days per week to address goals. Discharge Recommendations: Rehab  Further Equipment Recommendations for Discharge: To Be Determined (TBD) at next level of care      SUBJECTIVE:   Patient stated It feels better to get up and use a toilet.     OBJECTIVE DATA SUMMARY:     Past Medical History:   Diagnosis Date    [facility]    [facility]    Chronic kidney disease     stage 3    [facility] 1990's    Gout     Hypercholesterolemia     Hypertension 1990's    Liver disease 1990's    Hep C (cured)    OA (osteoarthritis)     Sleep apnea     uses CPAP    Thyroid disease 2005    cancer     Past Surgical History:   Procedure Laterality Date    HX BREAST LUMPECTOMY Right     X 2    HX HEENT  2005    total thyroidectomy    HX HYSTERECTOMY      BSO    HX KNEE REPLACEMENT Left 2000    HX OPEN CHOLECYSTECTOMY      HX OTHER SURGICAL      hemorrhoidectomy     Barriers to Learning/Limitations: yes;  physical  Compensate with: visual, verbal, tactile, kinesthetic cues/model    Prior Level of Function/Home Situation: Pt w/ architectural barriers of 2nd floor master bed/bath; pt has A/E for LE dressing  Home Situation  Home Environment: Private residence  # Steps to Enter: 6  Rails to Enter: Yes  Hand Rails : Bilateral  One/Two Story Residence: Two story  Living Alone: No  Support Systems: Family member(s)  Patient Expects to be Discharged to[de-identified] Private residence  Current DME Used/Available at Home: Cane, quad, CPAP, Glucometer, Grab bars, Shower chair, Walker, rollator  Tub or Shower Type: Tub/Shower combination  [x]  Right hand dominant   []  Left hand dominant    Cognitive/Behavioral Status:  Neurologic State: Alert; Appropriate for age  Orientation Level: Oriented X4  Cognition: Appropriate decision making; Appropriate for age attention/concentration; Appropriate safety awareness; Follows commands  Safety/Judgement: Awareness of environment; Insight into deficits    Skin: fragile  Edema: R hip    Vision/Perceptual:      appears WFL     Coordination:  Coordination: Within functional limits  Fine Motor Skills-Upper: Left Intact; Right Intact    Gross Motor Skills-Upper: Left Intact; Right Intact    Balance:  Sitting: Intact  Standing: Intact; With support    Strength:  Strength: Generally decreased, functional  Tone & Sensation:  Tone: Normal  Sensation: Intact  Range of Motion:  AROM: Generally decreased, functional  PROM: Generally decreased, functional    Functional Mobility and Transfers for ADLs:  Bed Mobility:  Supine to Sit: Minimum assistance (HOB elevated & assist for technique & RLE mgmt)  Sit to Supine:  Moderate assistance (cues for technique & sequencing & assist for RLE)  Scooting: Contact guard assistance  Transfers:  Sit to Stand: Contact guard assistance;Minimum assistance; Additional time; Adaptive equipment (bed slightly raised)   Toilet Transfer : Contact guard assistance;Minimum assistance; Additional time; Adaptive equipment (BS)     ADL Assessment:   Feeding: Setup    Oral Facial Hygiene/Grooming: Setup (seated)    Bathing: Maximum assistance    Upper Body Dressing: Minimum assistance    Lower Body Dressing: Maximum assistance    Toileting: Maximum assistance    ADL Intervention:  Feeding  Feeding Assistance: Supervision/set-up  Container Management: Modified independent  Cutting Food: Modified indpendent  Utensil Management: Modified independent  Food to Mouth: Supervision/set-up  Drink to Mouth: Supervision/set-up    Grooming  Washing Hands: Supervision/set-up (seated)    Lower Body Dressing Assistance  Socks:  (uses sock aide)    Toileting  Toileting Assistance: Maximum assistance  Bladder Hygiene: Contact guard assistance  Bowel Hygiene: Total assistance (dependent)  Clothing Management: Maximum assistance  Cues: Physical assistance for pants down;Physical assistance for pants up; Tactile cues provided;Verbal cues provided;Visual cues provided  Adaptive Equipment: Walker [facility])    Cognitive Retraining  Problem Solving: Identifying the task; Identifying the problem;General alternative solution;Deductive reason  Organizing/Sequencing: Prioritizing;Breaking task down  Following Commands: Follows two step commands/directions  Safety/Judgement: Awareness of environment; Insight into deficits  Cues: Tactile cues provided;Verbal cues provided;Visual cues provided    Pain:  Pre-treatment: 3/10  Post-treatment: 3/10    Activity Tolerance:   Patient able to stand <1 minute(s). Patient able to complete ADLs with frequent rest breaks. Patient limited by strength/balance/habitus.  Patient unsteady     Please refer to the flowsheet for vital signs taken during this treatment. After treatment:   [] Patient left in no apparent distress sitting up in chair  [x] Patient left in no apparent distress in bed  [x] Call bell left within reach  [x] Nursing notified/Batsheva  [] Caregiver present  [] Bed alarm activated    COMMUNICATION/EDUCATION:   [x] Home safety education was provided and the patient/caregiver indicated understanding. [x] Patient/family have participated as able in goal setting and plan of care. [x] Patient/family agree to work toward stated goals and plan of care. [] Patient understands intent and goals of therapy, but is neutral about his/her participation. [] Patient is unable to participate in goal setting and plan of care. Thank you for this referral.  Katarina Almanza, OTR/L  Time Calculation: 53 mins    G-Codes (GP)  Self Care   Current  CL= 60-79%   Goal  CI= 1-19%  The severity rating is based on the professional judgement & direct observation of Level of Assistance required for Functional Mobility and ADLs. Eval Complexity: History: HIGH Complexity : Extensive review of history including physical, cognitive and psychosocial history ; Examination: HIGH Complexity : 5 or more performance deficits relating to physical, cognitive , or psychosocial skils that result in activity limitations and / or participation restrictions; Decision Making:HIGH Complexity : Patient presents with comorbidities that affect occupational performance.  Signifigant modification of tasks or assistance (eg, physical or verbal) with assessment (s) is necessary to enable patient to complete evaluation

## 2018-03-14 NOTE — DISCHARGE SUMMARY
[facility]    Name:Lore GODFREY  MR#: [id number]  : 1950  ACCOUNT #: [de-identified]   DATE OF SERVICE: 03/15/2018    She will be going to a skilled nursing facility on Thursday the . We do not know exactly which facility at this point. CHIEF COMPLAINT:  Right hip pain. HISTORY:  This is a 64-year-old female who was admitted for right hip replacement. PAST MEDICAL HISTORY:  Significant for morbid obesity, diabetes and hypertension. HOSPITAL COURSE:  The patient was admitted and underwent a right hip replacement. Postoperatively, she developed anemia requiring a transfusion of a total of 4 units of packed red blood cells over two days. The patient is progressing with physical therapy, but still requires a lot of assistance. She continues to require skilled care. At this point, she will be transferred to a skilled nursing facility to continue her recovery. FINAL DIAGNOSIS:  1. Right hip degenerative joint disease. 2.  Blood loss anemia. 3.  Morbid obesity. 4.  Hypertension. 5.  Diabetes. PROCEDURES:  Right total hip replacement, transfusion total of 4 units of packed red blood cells. DISPOSITION:  The patient will be transferred to a rehab facility. DIET:  Diabetic diet. MEDICATIONS:  See attached med rec sheet and attached prescriptions. ACTIVITY:  Full weightbearing as tolerated with assistance. DISCHARGE INSTRUCTIONS:  The patient's dressing should be changed on a daily basis. The plan is to see the patient in the office at the 2-week postop sandrita.       Kathye Schwab, MD RJS / MOUNA  D: 2018 10:56     T: 2018 14:54  JOB #: [id number]

## 2018-03-15 ENCOUNTER — APPOINTMENT (OUTPATIENT)
Dept: GENERAL RADIOLOGY | Age: 68
DRG: 470 | End: 2018-03-15
Attending: HOSPITALIST
Payer: MEDICARE

## 2018-03-15 VITALS
OXYGEN SATURATION: 100 % | TEMPERATURE: 97.8 F | DIASTOLIC BLOOD PRESSURE: 58 MMHG | BODY MASS INDEX: 45.85 KG/M2 | HEIGHT: 67 IN | RESPIRATION RATE: 16 BRPM | SYSTOLIC BLOOD PRESSURE: 126 MMHG | HEART RATE: 82 BPM | WEIGHT: 292.11 LBS

## 2018-03-15 LAB
APPEARANCE UR: CLEAR
BACTERIA URNS QL MICRO: ABNORMAL /HPF
BASOPHILS # BLD: 0 K/UL (ref 0–0.06)
BASOPHILS NFR BLD: 0 % (ref 0–2)
BILIRUB UR QL: NEGATIVE
COLOR UR: YELLOW
DIFFERENTIAL METHOD BLD: ABNORMAL
EOSINOPHIL # BLD: 0.2 K/UL (ref 0–0.4)
EOSINOPHIL NFR BLD: 2 % (ref 0–5)
EPITH CASTS URNS QL MICRO: ABNORMAL /LPF (ref 0–5)
ERYTHROCYTE [DISTWIDTH] IN BLOOD BY AUTOMATED COUNT: 15.8 % (ref 11.6–14.5)
GLUCOSE BLD STRIP.AUTO-MCNC: 166 MG/DL (ref 70–110)
GLUCOSE BLD STRIP.AUTO-MCNC: 186 MG/DL (ref 70–110)
GLUCOSE BLD STRIP.AUTO-MCNC: 265 MG/DL (ref 70–110)
GLUCOSE UR STRIP.AUTO-MCNC: 100 MG/DL
HCT VFR BLD AUTO: 27.7 % (ref 35–45)
HGB BLD-MCNC: 8.8 G/DL (ref 12–16)
HGB UR QL STRIP: NEGATIVE
KETONES UR QL STRIP.AUTO: NEGATIVE MG/DL
LEUKOCYTE ESTERASE UR QL STRIP.AUTO: NEGATIVE
LYMPHOCYTES # BLD: 2.6 K/UL (ref 0.9–3.6)
LYMPHOCYTES NFR BLD: 18 % (ref 21–52)
MCH RBC QN AUTO: 27.8 PG (ref 24–34)
MCHC RBC AUTO-ENTMCNC: 31.8 G/DL (ref 31–37)
MCV RBC AUTO: 87.7 FL (ref 74–97)
MONOCYTES # BLD: 1 K/UL (ref 0.05–1.2)
MONOCYTES NFR BLD: 7 % (ref 3–10)
MUCOUS THREADS URNS QL MICRO: NEGATIVE /LPF
NEUTS SEG # BLD: 10.9 K/UL (ref 1.8–8)
NEUTS SEG NFR BLD: 73 % (ref 40–73)
NITRITE UR QL STRIP.AUTO: NEGATIVE
PH UR STRIP: 5 [PH] (ref 5–8)
PLATELET # BLD AUTO: 172 K/UL (ref 135–420)
PMV BLD AUTO: 11.1 FL (ref 9.2–11.8)
PROT UR STRIP-MCNC: NEGATIVE MG/DL
RBC # BLD AUTO: 3.16 M/UL (ref 4.2–5.3)
RBC #/AREA URNS HPF: NEGATIVE /HPF (ref 0–5)
SP GR UR REFRACTOMETRY: 1.01 (ref 1–1.03)
UROBILINOGEN UR QL STRIP.AUTO: 0.2 EU/DL (ref 0.2–1)
WBC # BLD AUTO: 14.7 K/UL (ref 4.6–13.2)
WBC URNS QL MICRO: NEGATIVE /HPF (ref 0–5)

## 2018-03-15 PROCEDURE — 74011250637 HC RX REV CODE- 250/637: Performed by: PHYSICIAN ASSISTANT

## 2018-03-15 PROCEDURE — 36415 COLL VENOUS BLD VENIPUNCTURE: CPT | Performed by: ORTHOPAEDIC SURGERY

## 2018-03-15 PROCEDURE — 77030011256 HC DRSG MEPILEX <16IN NO BORD MOLN -A

## 2018-03-15 PROCEDURE — 82962 GLUCOSE BLOOD TEST: CPT

## 2018-03-15 PROCEDURE — 74011636637 HC RX REV CODE- 636/637: Performed by: PHYSICIAN ASSISTANT

## 2018-03-15 PROCEDURE — 74011250636 HC RX REV CODE- 250/636: Performed by: ORTHOPAEDIC SURGERY

## 2018-03-15 PROCEDURE — 71045 X-RAY EXAM CHEST 1 VIEW: CPT

## 2018-03-15 PROCEDURE — 81001 URINALYSIS AUTO W/SCOPE: CPT | Performed by: ORTHOPAEDIC SURGERY

## 2018-03-15 PROCEDURE — 97116 GAIT TRAINING THERAPY: CPT

## 2018-03-15 PROCEDURE — 85025 COMPLETE CBC W/AUTO DIFF WBC: CPT | Performed by: ORTHOPAEDIC SURGERY

## 2018-03-15 PROCEDURE — 74011250637 HC RX REV CODE- 250/637: Performed by: ORTHOPAEDIC SURGERY

## 2018-03-15 PROCEDURE — 97530 THERAPEUTIC ACTIVITIES: CPT

## 2018-03-15 PROCEDURE — 74011636637 HC RX REV CODE- 636/637: Performed by: HOSPITALIST

## 2018-03-15 PROCEDURE — 74011250636 HC RX REV CODE- 250/636: Performed by: HOSPITALIST

## 2018-03-15 PROCEDURE — 97110 THERAPEUTIC EXERCISES: CPT

## 2018-03-15 RX ORDER — INSULIN GLARGINE 100 [IU]/ML
12 INJECTION, SOLUTION SUBCUTANEOUS
Status: DISCONTINUED | OUTPATIENT
Start: 2018-03-15 | End: 2018-03-15 | Stop reason: HOSPADM

## 2018-03-15 RX ORDER — FUROSEMIDE 40 MG/1
40 TABLET ORAL
Status: COMPLETED | OUTPATIENT
Start: 2018-03-15 | End: 2018-03-15

## 2018-03-15 RX ORDER — FUROSEMIDE 10 MG/ML
40 INJECTION INTRAMUSCULAR; INTRAVENOUS ONCE
Status: COMPLETED | OUTPATIENT
Start: 2018-03-15 | End: 2018-03-15

## 2018-03-15 RX ORDER — INSULIN LISPRO 100 [IU]/ML
8 INJECTION, SOLUTION INTRAVENOUS; SUBCUTANEOUS
Status: DISCONTINUED | OUTPATIENT
Start: 2018-03-15 | End: 2018-03-15 | Stop reason: HOSPADM

## 2018-03-15 RX ADMIN — ENOXAPARIN SODIUM 40 MG: 40 INJECTION SUBCUTANEOUS at 08:26

## 2018-03-15 RX ADMIN — ACETAMINOPHEN 650 MG: 325 TABLET ORAL at 12:25

## 2018-03-15 RX ADMIN — TRAMADOL HYDROCHLORIDE 50 MG: 50 TABLET, FILM COATED ORAL at 12:25

## 2018-03-15 RX ADMIN — INSULIN LISPRO 2 UNITS: 100 INJECTION, SOLUTION INTRAVENOUS; SUBCUTANEOUS at 06:38

## 2018-03-15 RX ADMIN — LEVOTHYROXINE SODIUM 200 MCG: 200 TABLET ORAL at 06:39

## 2018-03-15 RX ADMIN — TRAMADOL HYDROCHLORIDE 50 MG: 50 TABLET, FILM COATED ORAL at 08:26

## 2018-03-15 RX ADMIN — INSULIN LISPRO 6 UNITS: 100 INJECTION, SOLUTION INTRAVENOUS; SUBCUTANEOUS at 11:45

## 2018-03-15 RX ADMIN — INSULIN LISPRO 6 UNITS: 100 INJECTION, SOLUTION INTRAVENOUS; SUBCUTANEOUS at 08:24

## 2018-03-15 RX ADMIN — VITAMIN D, TAB 1000IU (100/BT) 1000 UNITS: 25 TAB at 08:26

## 2018-03-15 RX ADMIN — FUROSEMIDE 40 MG: 40 TABLET ORAL at 12:25

## 2018-03-15 RX ADMIN — FUROSEMIDE 40 MG: 10 INJECTION, SOLUTION INTRAMUSCULAR; INTRAVENOUS at 11:47

## 2018-03-15 RX ADMIN — INSULIN LISPRO 8 UNITS: 100 INJECTION, SOLUTION INTRAVENOUS; SUBCUTANEOUS at 16:25

## 2018-03-15 RX ADMIN — DOCUSATE SODIUM 100 MG: 100 CAPSULE, LIQUID FILLED ORAL at 08:26

## 2018-03-15 RX ADMIN — INSULIN LISPRO 2 UNITS: 100 INJECTION, SOLUTION INTRAVENOUS; SUBCUTANEOUS at 16:25

## 2018-03-15 RX ADMIN — ALLOPURINOL 100 MG: 100 TABLET ORAL at 08:26

## 2018-03-15 RX ADMIN — FUROSEMIDE 20 MG: 20 TABLET ORAL at 08:26

## 2018-03-15 RX ADMIN — INSULIN LISPRO 6 UNITS: 100 INJECTION, SOLUTION INTRAVENOUS; SUBCUTANEOUS at 11:46

## 2018-03-15 RX ADMIN — ACETAMINOPHEN 650 MG: 325 TABLET ORAL at 08:26

## 2018-03-15 NOTE — PROGRESS NOTES
D/c anticipate rehab  Met with patient at bedside. She was informed that [facility] and [facility] have decined her. She did agree to be submitted to [facility]. However, cm will send out to other area facilities for continuity of care    Telephone all from [place] from [place] informed cm that her team is reviewing patient now however, she may need authorization. . Patient made aware of this     [phone] telephone call with son to infor him of the places he had chosen are not in the network to include [facility], [facility] and [facility]. Did inform him cm has sent her to other locations for continuity of care and the 3 that had accepted were [facility] and [facility] he informed cm he was going to look at these facilities and get back with cm.    [facility] informed cm he would like for [facility] to apply for Auth. Telephone all zulma Jackson at [facility] at [address],  [address] [place], [place].  [phone]. He will start authorization as requested. 1210 Met with patient again during IDR's. Moreno informed team patient has slight elevation in wbc. Will have cxr done. [address] from [facility] at [address] [phone]. Informed cm that he has received auth. Reggie Mayer informed. He informed cm he has signed off. Telephone call to [facility] that patient has received authorization and need her to page Dr. Gerald Goldmann and let him know we have received authorization for admission at [facility] and and has been cleared by medicine. There is a discharge summary for yesterday however it will need to be updated    1500 telephone call to Vita Galeas states she has paged Dr. Gerald Goldmann several times and he has not responded. Daniel Stanford telphone call with [facility] charge nurse as to above.   1510 telephone call to Dr. Gerald Goldmann he informed cm to proceed with the d/c to rehab    [phone] telephone call to patient to inform her that charge nurse is setting up transportation for this patient, informed her that Dr. Tiffany Vargas informed cm to proceed with the d/ c to rehab. Patient informed cm she would call her son and let him know. 1515 telephone call with Teddy Duran, clinical lead she will call and set up transportation for this patient. Asked her to let cm know time. Patient has not been cleared per Lorette Oppenheim, PT to ride in a car will need transportation  RN please call report to [facility] at [address]  [address] [place], [place]. Report to [phone]. Confirmed with Perez Ek patient is coming today to [place] as per his informing cm he has authorization. Please include all hard scripts for controlled substances, med rec and dc summary in packet. Please medicate for pain prior to dc if possible and needed to help offset delay when patient first arrives to facility. 1600 telephone call to tanya not available [phone] CM went to unit and Tanya informed cm that she has transporation set up for 1700. Telephone call to Perez White transportation set up for 1700 he states \"that is perfect\"  Care Management Interventions  PCP Verified by CM:  Yes  Transition of Care Consult (CM Consult): SNF  Partner SNF: Yes  Physical Therapy Consult: Yes  Occupational Therapy Consult: Yes  Current Support Network: Relative's Home  Confirm Follow Up Transport: Family  Plan discussed with Pt/Family/Caregiver: Yes  Freedom of Choice Offered: Yes  Discharge Location  Discharge Placement: Skilled nursing facility

## 2018-03-15 NOTE — PROGRESS NOTES
0630- IV leaking, tried twice but unsuccessful, Charge nurse tried but was also unsuccessful. Pt wiped with CHG wipes, dressing on right hip changed.

## 2018-03-15 NOTE — PROGRESS NOTES
Problem: Mobility Impaired (Adult and Pediatric)  Goal: *Acute Goals and Plan of Care (Insert Text)  Physical Therapy Goals  Initiated 3/13/2018  to be met within 2-3 days  STG's:  1. Bed mobility:  Supine to/from sit with S in prep for ADL activity. 2. Transfers:  Sit to/from stand with S in prep for gait. 3. Standing/Ambulation Balance:  Good with LRAD for safe transfers and gait. LTG's  1. Ambulation:  Ambulate  ft.with S with LRAD for home mobility at discharge. 2. Patient Education:  S/Independent with HEP for home performance accurately at discharge. 3. Step Negotiation: Ascend/Descend 3-5 steps with CGA with HR for home navigation as indicated. Outcome: Progressing Towards Goal  physical Therapy TREATMENT    Patient: Liss Navarro (67 y.o. female)  Date: 3/15/2018  Diagnosis: RIGHT HIP DJD  Degenerative joint disease (DJD) of hip S/P hip replacement  Procedure(s) (LRB):  RIGHT ANTERIOR HIP REPLACEMENT WITH C-ARM **SPEC POP**  (Right) 3 Days Post-Op  Precautions: Fall, WBAT, Total hip, Skin (Anterior)   Chart, physical therapy assessment, plan of care and goals were reviewed. ASSESSMENT:  Pt reeducated in exercises and A&P of areas of discomfort. R LE advance is improving and pt now able to complete 80' of amb with 1 standing rest break. Pt still requires bed elevation to transfer to/from stand and is not safe to leave in chair. Recommendation for medical transport was relayed to CM. Will F/u in evening, if D/c is held. Progression toward goals:  []      Improving appropriately and progressing toward goals  [x]      Improving slowly and progressing toward goals  []      Not making progress toward goals and plan of care will be adjusted     PLAN:  Patient continues to benefit from skilled intervention to address the above impairments. Continue treatment per established plan of care.   Discharge Recommendations:  Iván Dominguez  Further Equipment Recommendations for Discharge: bedside commode and rolling walker     SUBJECTIVE:   Patient stated I think I'm leaving today. We can can still work.     OBJECTIVE DATA SUMMARY:   Critical Behavior:  Neurologic State: Alert  Orientation Level: Oriented X4  Cognition: Appropriate decision making, Appropriate for age attention/concentration, Appropriate safety awareness, Follows commands  Safety/Judgement: Awareness of environment, Insight into deficits  Functional Mobility Training:  Bed Mobility:  Rolling: Minimum assistance  Supine to Sit: Minimum assistance  Sit to Supine: Minimum assistance  Scooting: Contact guard assistance  Transfers:  Sit to Stand: Contact guard assistance  Stand to Sit: Contact guard assistance  Balance:  Sitting: Intact  Standing: Intact; With support  Standing - Static: Good  Standing - Dynamic : Fair  Ambulation/Gait Training:  Distance (ft): 90 Feet (ft)  Assistive Device: Gait belt;Walker, rolling  Ambulation - Level of Assistance: Stand-by assistance  Gait Abnormalities: Antalgic;Decreased step clearance; Step to gait  Right Side Weight Bearing: As tolerated  Base of Support: Widened  Speed/Ewa: Slow;Shuffled  Step Length: Right shortened;Left shortened  Swing Pattern: Right asymmetrical;Left asymmetrical  Therapeutic Exercises:   HEP in full with facilitation and feedback (x 15 min)   Pain:  Pain Scale 1: Numeric (0 - 10)  Pain Intensity 1: 0   Pain out: 2  Activity Tolerance:   Good  Please refer to the flowsheet for vital signs taken during this treatment.   After treatment:   [] Patient left in no apparent distress sitting up in chair  [x] Patient left in no apparent distress in bed  [x] Call bell left within reach  [x] Nursing notified  [x] Caregiver present  [] Bed alarm activated      Raina Jacome PTA   Time Calculation: 40 mins

## 2018-03-15 NOTE — PROGRESS NOTES
2521 Assumed patient care from off going Shmuel Morel RN. Patient is alert x 4 resting in bed and appears to be in no sign of distress. Bed left in lowest position with call bell left within reach. Whiteboard updated.

## 2018-03-15 NOTE — PROGRESS NOTES
POD3  Alert  VSS;  Hip incision healing  Progressing with PT; no burning with urination  Awaiting SNF  Labs WBC 15 today  Imp : ?UTI other  Plan: Ordered UA/C+S: will have hospitalist re-eval

## 2018-03-15 NOTE — PROGRESS NOTES
Hospitalist Progress Note    Patient: Isidra Santamaria MRN: [id number]  CSN: [id number]    YOB: 1950  Age: 69 y.o. Sex: female    DOA: 3/12/2018 LOS:  LOS: 3 days          Chief Complaint:    Consult for Postoperative Tachycardia    Assessment/Plan     1. Acute Blood Loss Anemia  2. S/P Hip Replacement  3. CKD 3   4. ROBERTO on CPAP  5. IDDM  6. Morbid Obesity   7. Hypertension  8. Hx of Thyroid Cancer  9. Hypothyroidism    1. Patient previously received PRBC transfusion. H/H is stable. Will continue to monitor with daily CBC. 2. Continue routine postoperative management, pain control. PT/OT, d/c plan per primary team.   4. Continue CPAP. 5. Continue 12 units Lantus qhs, Humalog 8 units TIDAC, diabetic diet, SSI, hypoglycemia protocol. 7. Stable. 9. Continue Synthroid. Patient has elevated WBC count. CXR ordered, read as no acute process by radiologist. Patient received additional oral dose of 40mg Lasix. UA appears clear at this time. Negative for nitrites and leukocyte esterase, however epithelial cells, WBC, RBC, and bacteria is pending at this time. Medicine will sign off at this time. Patient's vitals are stable. No urinary complaints. No CP/SOB, exam is benign. DVT Prophylaxis - Lovenox  Dispo: Rehab in coming days, per primary team.     Patient Active Problem List   Diagnosis Code    Acute blood loss anemia D62    ROBERTO (obstructive sleep apnea) G47.33    Insulin dependent diabetes mellitus (HCC) E11.9, Z79.4    Morbid obesity (HCC) E66.01    S/P hip replacement Z96.649       Subjective:    No complaints. Anxious to get to rehab so she can work to get home. Review of systems:    Constitutional: denies fevers, chills, myalgias  Respiratory: denies SOB, cough  Cardiovascular: denies chest pain, palpitations  Gastrointestinal: denies nausea, vomiting, diarrhea      Vital signs/Intake and Output:  Visit Vitals    /62 (BP 1 Location: Left arm, BP Patient Position:  At rest;Supine; Head of bed elevated (Comment degrees))    Pulse 74    Temp 97.9 °F (36.6 °C)    Resp 14    Ht 5' 7\" (1.702 m)    Wt 132.5 kg (292 lb 1.8 oz)    SpO2 100%    BMI 45.75 kg/m2     Current Shift:  03/15 0701 - 03/15 1900  In: 480 [P.O.:480]  Out: 2200 [Urine:2200]  Last three shifts:  03/13 1901 - 03/15 0700  In: 1970 [P.O.:720; I.V.:1250]  Out: 4290 [Urine:4290]    Exam:    General: Obese female, alert, NAD, OX3  Head/Neck: NCAT, supple, No masses, No lymphadenopathy  CVS:Regular rate and rhythm, no M/R/G, S1/S2 heard, no thrill  Lungs:Clear to auscultation bilaterally, no wheezes, rhonchi, or rales  Abdomen: Soft, Nontender, No distention, Normal Bowel sounds, No hepatomegaly  Extremities: R hip dressed. Skin:normal texture and turgor, no rashes, no lesions  Neuro:grossly normal , follows commands  Psych:appropriate                Labs: Results:       Chemistry Recent Labs      03/14/18   0206  03/13/18   0322  03/12/18   2355   GLU  129*  185*  178*   NA  139  140  140   K  4.5  5.4  5.7*   CL  107  108  106   CO2  26  23  25   BUN  27*  29*  30*   CREA  1.39*  1.74*  1.79*   CA  7.8*  8.0*  8.1*   AGAP  6  9  9   BUCR  19  17  17      CBC w/Diff Recent Labs      03/15/18   0520  03/14/18   0206  03/13/18   1914   03/12/18   2355   WBC  14.7*  13.7*   --    --   10.8   RBC  3.16*  3.20*   --    --   2.99*   HGB  8.8*  8.7*  8.5*   < >  8.3*   HCT  27.7*  27.7*  26.1*   < >  27.3*   PLT  172  164   --    --   185   GRANS  73   --    --    --    --    LYMPH  18*   --    --    --    --    EOS  2   --    --    --    --     < > = values in this interval not displayed. Cardiac Enzymes No results for input(s): CPK, CKND1, VIOLET in the last 72 hours. No lab exists for component: CKRMB, TROIP   Coagulation No results for input(s): PTP, INR, APTT in the last 72 hours.     No lab exists for component: INREXT    Lipid Panel No results found for: CHOL, CHOLPOCT, CHOLX, CHLST, CHOLV, 659579, HDL, LDL, LDLC, DLDLP, 192543, VLDLC, VLDL, TGLX, TRIGL, TRIGP, TGLPOCT, CHHD, CHHDX   BNP No results for input(s): BNPP in the last 72 hours. Liver Enzymes No results for input(s): TP, ALB, TBIL, AP, SGOT, GPT in the last 72 hours.     No lab exists for component: DBIL   Thyroid Studies No results found for: T4, T3U, TSH, TSHEXT     Procedures/imaging: see electronic medical records for all procedures/Xrays and details which were not copied into this note but were reviewed prior to creation of [address], PA-C

## 2018-03-15 NOTE — ROUTINE PROCESS
Bedside and Verbal shift change report given to Ivonne Grimm RN (oncoming nurse) by Kyrie Steward RN (offgoing nurse). Report included the following information SBAR and Kardex.

## 2018-03-15 NOTE — PROGRESS NOTES
Problem: Falls - Risk of  Goal: *Absence of Falls  Document Lolis Fall Risk and appropriate interventions in the flowsheet.    Outcome: Progressing Towards Goal  Fall Risk Interventions:  Mobility Interventions: Assess mobility with egress test, Bed/chair exit alarm, Communicate number of staff needed for ambulation/transfer, PT Consult for mobility concerns, PT Consult for assist device competence, Strengthening exercises (ROM-active/passive), Utilize walker, cane, or other assitive device         Medication Interventions: Bed/chair exit alarm, Evaluate medications/consider consulting pharmacy, Patient to call before getting OOB, Teach patient to arise slowly    Elimination Interventions: Bed/chair exit alarm, Call light in reach, Toileting schedule/hourly rounds

## 2018-03-15 NOTE — ROUTINE PROCESS
1658 Report given to Teetee Garza RN at [facility]. Report included SBAR, recent vitals, recent flu vaccination, MAR, etc. Opportunity given for oncoming nurse to ask questions.

## 2018-03-15 NOTE — ROUTINE PROCESS
Bedside and Verbal shift change report given to Cher Marie RN (oncoming nurse) by MICAH Geller (offgoing nurse). Report included the following information SBAR, Kardex, Intake/Output and MAR.

## 2018-03-15 NOTE — DISCHARGE INSTRUCTIONS
DISCHARGE SUMMARY from Nurse    PATIENT INSTRUCTIONS:    After general anesthesia or intravenous sedation, for 24 hours or while taking prescription Narcotics:  · Limit your activities  · Do not drive and operate hazardous machinery  · Do not make important personal or business decisions  · Do  not drink alcoholic beverages  · If you have not urinated within 8 hours after discharge, please contact your surgeon on call. Report the following to your surgeon:  · Excessive pain, swelling, redness or odor of or around the surgical area  · Temperature over 100.5  · Nausea and vomiting lasting longer than 4 hours or if unable to take medications  · Any signs of decreased circulation or nerve impairment to extremity: change in color, persistent  numbness, tingling, coldness or increase pain  · Any questions    What to do at Home:  Recommended activity: Activity as tolerated. Please follow up with primary care provider. *  Please give a list of your current medications to your Primary Care Provider. *  Please update this list whenever your medications are discontinued, doses are      changed, or new medications (including over-the-counter products) are added. *  Please carry medication information at all times in case of emergency situations. These are general instructions for a healthy lifestyle:    No smoking/ No tobacco products/ Avoid exposure to second hand smoke  Surgeon General's Warning:  Quitting smoking now greatly reduces serious risk to your health.     Obesity, smoking, and sedentary lifestyle greatly increases your risk for illness    A healthy diet, regular physical exercise & weight monitoring are important for maintaining a healthy lifestyle    You may be retaining fluid if you have a history of heart failure or if you experience any of the following symptoms:  Weight gain of 3 pounds or more overnight or 5 pounds in a week, increased swelling in our hands or feet or shortness of breath while lying flat in bed. Please call your doctor as soon as you notice any of these symptoms; do not wait until your next office visit. Recognize signs and symptoms of STROKE:    F-face looks uneven    A-arms unable to move or move unevenly    S-speech slurred or non-existent    T-time-call 911 as soon as signs and symptoms begin-DO NOT go       Back to bed or wait to see if you get better-TIME IS BRAIN. Warning Signs of HEART ATTACK     Call 911 if you have these symptoms:   Chest discomfort. Most heart attacks involve discomfort in the center of the chest that lasts more than a few minutes, or that goes away and comes back. It can feel like uncomfortable pressure, squeezing, fullness, or pain.  Discomfort in other areas of the upper body. Symptoms can include pain or discomfort in one or both arms, the back, neck, jaw, or stomach.  Shortness of breath with or without chest discomfort.  Other signs may include breaking out in a cold sweat, nausea, or lightheadedness. Don't wait more than five minutes to call 911 - MINUTES MATTER! Fast action can save your life. Calling 911 is almost always the fastest way to get lifesaving treatment. Emergency Medical Services staff can begin treatment when they arrive -- up to an hour sooner than if someone gets to the hospital by car. The discharge information has been reviewed with the patient. The patient verbalized understanding. Discharge medications reviewed with the patient and appropriate educational materials and side effects teaching were provided. ___________________________________________________________________________________________________________________________________      Lab Results   Component Value Date/Time    Hemoglobin A1c 6.6 (H) 03/13/2018 03:22 AM     An A1C of 5.7-6.4% meets the criteria for pre-diabetes; an A1C of 6.5% or higher meets the criteria for diabetes.      This lab test reflects that your blood sugar averaged 143 mg/dL over the past 3 months. It is important to follow up with your provider on a routine basis to continue to evaluate your blood sugar and discuss any necessary changes in treatment.         Patient armband removed and shredded

## 2018-03-16 NOTE — PROGRESS NOTES
UA result received today with 3+ bacteria. [facility] GAMALIEL, spoke to patient's nurse to inform them and recommend treatment. The nurse requested a copy of the UA result so they can treat. Spoke to Judi in case management and she will forward UA result to Dilma.

## 2018-03-18 LAB
ATRIAL RATE: 112 BPM
CALCULATED P AXIS, ECG09: 53 DEGREES
CALCULATED R AXIS, ECG10: 18 DEGREES
CALCULATED T AXIS, ECG11: 87 DEGREES
DIAGNOSIS, 93000: NORMAL
P-R INTERVAL, ECG05: 160 MS
Q-T INTERVAL, ECG07: 332 MS
QRS DURATION, ECG06: 86 MS
QTC CALCULATION (BEZET), ECG08: 453 MS
VENTRICULAR RATE, ECG03: 112 BPM

## 2018-08-13 ENCOUNTER — HOSPITAL ENCOUNTER (OUTPATIENT)
Dept: PREADMISSION TESTING | Age: 68
Discharge: HOME | End: 2018-08-13
Payer: MEDICARE

## 2018-08-13 VITALS — WEIGHT: 287 LBS | BODY MASS INDEX: 45.04 KG/M2 | HEIGHT: 67 IN

## 2018-08-13 LAB
ALBUMIN SERPL-MCNC: 3.6 G/DL (ref 3.4–5)
ALBUMIN/GLOB SERPL: 1.1 {RATIO} (ref 0.8–1.7)
ALP SERPL-CCNC: 81 U/L (ref 45–117)
ALT SERPL-CCNC: 22 U/L (ref 13–56)
ANION GAP SERPL CALC-SCNC: 8 MMOL/L (ref 3–18)
AST SERPL-CCNC: 17 U/L (ref 15–37)
BACTERIA SPEC CULT: NORMAL
BASOPHILS # BLD: 0 K/UL (ref 0–0.1)
BASOPHILS NFR BLD: 0 % (ref 0–2)
BILIRUB SERPL-MCNC: 0.6 MG/DL (ref 0.2–1)
BUN SERPL-MCNC: 42 MG/DL (ref 7–18)
BUN/CREAT SERPL: 25 (ref 12–20)
CALCIUM SERPL-MCNC: 9.1 MG/DL (ref 8.5–10.1)
CHLORIDE SERPL-SCNC: 105 MMOL/L (ref 100–108)
CO2 SERPL-SCNC: 28 MMOL/L (ref 21–32)
CREAT SERPL-MCNC: 1.71 MG/DL (ref 0.6–1.3)
DIFFERENTIAL METHOD BLD: ABNORMAL
EOSINOPHIL # BLD: 0.2 K/UL (ref 0–0.4)
EOSINOPHIL NFR BLD: 1 % (ref 0–5)
ERYTHROCYTE [DISTWIDTH] IN BLOOD BY AUTOMATED COUNT: 17 % (ref 11.6–14.5)
EST. AVERAGE GLUCOSE BLD GHB EST-MCNC: 160 MG/DL
GLOBULIN SER CALC-MCNC: 3.3 G/DL (ref 2–4)
GLUCOSE SERPL-MCNC: 124 MG/DL (ref 74–99)
HBA1C MFR BLD: 7.2 % (ref 4.5–5.6)
HCT VFR BLD AUTO: 35.8 % (ref 35–45)
HGB BLD-MCNC: 11.4 G/DL (ref 12–16)
LYMPHOCYTES # BLD: 3.8 K/UL (ref 0.9–3.6)
LYMPHOCYTES NFR BLD: 36 % (ref 21–52)
MCH RBC QN AUTO: 27 PG (ref 24–34)
MCHC RBC AUTO-ENTMCNC: 31.8 G/DL (ref 31–37)
MCV RBC AUTO: 84.8 FL (ref 74–97)
MONOCYTES # BLD: 0.6 K/UL (ref 0.05–1.2)
MONOCYTES NFR BLD: 6 % (ref 3–10)
NEUTS SEG # BLD: 6 K/UL (ref 1.8–8)
NEUTS SEG NFR BLD: 57 % (ref 40–73)
PLATELET # BLD AUTO: 254 K/UL (ref 135–420)
PMV BLD AUTO: 12 FL (ref 9.2–11.8)
POTASSIUM SERPL-SCNC: 4.9 MMOL/L (ref 3.5–5.5)
PROT SERPL-MCNC: 6.9 G/DL (ref 6.4–8.2)
RBC # BLD AUTO: 4.22 M/UL (ref 4.2–5.3)
SERVICE CMNT-IMP: NORMAL
SODIUM SERPL-SCNC: 141 MMOL/L (ref 136–145)
WBC # BLD AUTO: 10.6 K/UL (ref 4.6–13.2)

## 2018-08-13 PROCEDURE — 87641 MR-STAPH DNA AMP PROBE: CPT | Performed by: ORTHOPAEDIC SURGERY

## 2018-08-13 PROCEDURE — 80053 COMPREHEN METABOLIC PANEL: CPT | Performed by: ORTHOPAEDIC SURGERY

## 2018-08-13 PROCEDURE — 93005 ELECTROCARDIOGRAM TRACING: CPT

## 2018-08-13 PROCEDURE — 85025 COMPLETE CBC W/AUTO DIFF WBC: CPT | Performed by: ORTHOPAEDIC SURGERY

## 2018-08-13 PROCEDURE — 83036 HEMOGLOBIN GLYCOSYLATED A1C: CPT | Performed by: ORTHOPAEDIC SURGERY

## 2018-08-13 PROCEDURE — 87086 URINE CULTURE/COLONY COUNT: CPT | Performed by: ORTHOPAEDIC SURGERY

## 2018-08-13 NOTE — PERIOP NOTES
Uses CPAP and was instructed to bring on admission. No removable prosthetic devices or family history of malignant hyperthermia. Care fusion kit and instructions given and reviewed. PCP is not aware of the surgery. No participation in clinical trial or research study. Meets criteria for special population- Stage 3 kidney disease. Posting notified.

## 2018-08-14 ENCOUNTER — HOSPITAL ENCOUNTER (OUTPATIENT)
Dept: PHYSICAL THERAPY | Age: 68
Discharge: HOME | End: 2018-08-14
Payer: MEDICARE

## 2018-08-14 PROCEDURE — 97161 PT EVAL LOW COMPLEX 20 MIN: CPT | Performed by: PHYSICAL THERAPIST

## 2018-08-14 PROCEDURE — 97110 THERAPEUTIC EXERCISES: CPT | Performed by: PHYSICAL THERAPIST

## 2018-08-14 PROCEDURE — G8985 CARRY GOAL STATUS: HCPCS | Performed by: PHYSICAL THERAPIST

## 2018-08-14 PROCEDURE — G8986 CARRY D/C STATUS: HCPCS | Performed by: PHYSICAL THERAPIST

## 2018-08-14 PROCEDURE — G8984 CARRY CURRENT STATUS: HCPCS | Performed by: PHYSICAL THERAPIST

## 2018-08-14 NOTE — PROGRESS NOTES
[facility]  THE [facility]       [address] [place], [address] [facility]  Ph [phone]  Fx [phone]    Orthopedic Surgical Pre-op Assessment and Plan of Care     Patient name: Johanna Ocasio Start of Care: 2018   Referral source: Mohan Espinoza MD : [id number]    Medical Diagnosis: Primary osteoarthritis, left shoulder [M19.012] Surgery Date:2018    Prior Hospitalization: see medical history Provider#: [id number]   Medications: Verified on Patient summary List   Date:2018      [x]  Patient  Verified  Payor: Chrisethel Luis / Plan: [address] / Product Type: Managed Care Medicare /          In time:10:30  Out time:11:30  Total Treatment Time (min): 60  Total Timed Codes (min): 30  1:1 Treatment Time (1969 only): 60   Visit #: 1 of            Subjective Review:  Pain Level (0-10 scale): 8  Any medication changes, allergies to medications, adverse drug reactions, diagnosis change, or new procedure performed?: [x] No    [] Yes (see summary sheet for update)    Patient has CC of left shoulder pain for 10years. POP: denies Patient describes pain as achy, intermittent. No diurnal features. Denies numbness/tingling. Reports popping/clicking. Aggravating factors:using the arm, laying on left side. Alleviating factors: not using it. Denies red flags: SOB, chest pain, dizziness/lightheadedness, blurred/double vision, HA, chills/fevers, night sweats, change in bowel/bladder control, abdominal pain, difficulty swallowing, slurred speech, unexplained weight gain/loss. PMHx: DMII, HTN, OA. Surgical Hx: Left TKA , Right TKA 3/2018. Social Hx: 2 level home, occasional alcohol denies, tobacco, work status: retired. PLOF: walking, independent w/ ADLs. CLOF: same. Diagnostic Imaging: x-ray      Motivation: high  Substance use: [] Alcohol     [] tobacco [] other:  Cognition: A & O x 4    Other:  What type of home do you live in now?  Single family 2story    Do you plan to return there after surgery? Yes                   If no, where are planning to live after surgery? How many stairs/steps to enter your home? 6  Railing:  Yes    How many stairs/steps inside of your home? 13 Railing:  Yes     Can you stay on the entry level? Not applicable    Do you live alone? No    Will anyone be available to help you when you leave the hospital?  Yes                If yes, what is their relationship to you? Son and his family         Can they assist you with bathing and dressing? No     Can someone assist you with transportation to physical therapy? Yes    Where do you plan to go when you leave the hospital? home    What is your employment status? [x] Retired     [] Disabled     [] Employed  []  Unemployed  If EMPLOYED, job requires mainly: [] Sitting     [] Light Activity     [] Heavy Activity  Do you own or are any of the following items available to you (check all that apply)? [x] Walker: [] Wheels [] No Wheels     [x] [employer]     [] Crutches     [] Shower Chair/Tub Bench   [] Bedside Commode/Elevated Toilet Seat     [] Other equipment: In your bathroom, do you have the following? Check all that apply. [] Tub Only   [x] Tub/Shower Combination  [] Walk-in Shower    Are you:  right handed     Describe your Functional Level (check all that apply):     Bathing [x] Independent   [] Require Assistance from someone   [] Sponge Bath Only        Dressing [x] Independent     [] Require assistance from someone for: [] socks/shoes   [] pants   [] everything   Walking [x] Independent   [] Walk indoors only   [] Walk outdoors   [] Use assistive device       [] Use wheelchair only        Household Activities [] Routine house & yard work   [x] Light house work only   [] None     ROM:  [] Unable to assess at this time     Strength:   [] Unable to assess at this time [] Left  [] Right Gross Strength Left Right   Elevation against gravity [] WNL  [] Impaired  [] Unable Flexion [] WNL  [] Impaired  [] Unable [] WNL  [] Impaired  [] Unable   Quality of motion [] WNL  [] Impaired   Scaption/ABD [] WNL  [] Impaired  [] Unable [] WNL  [] Impaired  [] Unable   Endfeel [] Soft  [] Hard  [] Springy  [] Empty IR/ER [] WNL  [] Impaired  [] Unable [] WNL  [] Impaired  [] Unable     Objective Screen:  1. Functional testing to determine fall risk (to be completed with a sling on the operative arm)  a. TUG: Time 10.9 sec    Assistive Device: none    Fall Risk :  No * a score of >14 seconds indicates fall risk   (low complexity <10sec, moderate complexity 10-20sec, high complexity >20sec)  b. Stair Climbing Test: Time 24sec  Assistive Device: none   Fall Risk :   Yes  c. Functional Screen:   Sit-Supine:  Independent           Supine-sit:  Supervision and Stand-by assistance                Transfers:    Independent        Static Balance: 1 trials for 30 seconds with S assistance       [place]homberg: [x]Floor   []Foam  [] Other    MSR:           []Floor   []Foam  [] Other  Gait Distance/Safety:   Household Distances with none and Indep assistance. Capable caregiver  Not applicable  Community Distances with none  and indep assistance. Capable caregiver  Not applicable    15 min Therapeutic Exercise:  [x] See flow sheet :   Rationale: increase ROM, increase strength and decrease pain to improve the patients ability to complete ADLs    Patient/Caregiver Education/Goals: To be accomplished in 1 visit  1. Caregiver/Patient demonstrates understanding of TSR precautions as stated on education form. Goal MET  Yes  2. Caregiver/Patient demonstrates and verbalizes understanding of HEP as issued on exercise handout (Pendulums, shoulder isometrics: flexion, extension, abduction, scapular squeezes, assisted elbow flexion/extension, wrist AROM, gripping). Goal MET  Yes  3.  Caregiver/Patient demonstrates understanding of Donning/Magdalena a shirt withTSR precautions as stated on education form. Goal MET  Not applicable  4. Caregiver/Patient demonstrates understanding of Donning/Magdalena sling withTSR precautions as stated on education form. Goal MET  Not applicable  Pain Level at end of session (0-10 scale): 8     The Plan of Care and following information is based on the information from the initial evaluation.   Assessment/ key information: Patient is two weeks prior to left total shoulder replacement on 8/24/2018    Evaluation Complexity History HIGH Complexity :3+ comorbidities / personal factors will impact the outcome/ POC ; Examination MEDIUM Complexity : 3 Standardized tests and measures addressing body structure, function, activity limitation and / or participation in recreation  ;Presentation LOW Complexity : Stable, uncomplicated  ;Clinical Decision Making Other outcome measures DASH= 57%  MEDIUM  Overall Complexity Rating: LOW     Problem List: pain affecting function, decrease ROM, decrease strength, decrease ADL/ functional abilitiies, decrease activity tolerance, decrease flexibility/ joint mobility and decrease transfer abilities     Treatment Plan may include any combination of the following: Therapeutic exercise, Therapeutic activities, Neuromuscular re-education, Physical agent/modality, Manual therapy, Patient education and Self Care training    Patient / Family readiness to learn indicated by: asking questions, trying to perform skills and interest    Persons(s) to be included in education: patient (P)    Barriers to Learning/Limitations: None    Patient Goal Following Surgery: pain free and mobility    Patient Self Reported Health Status: good    Rehabilitation Potential: fair    Recommend Discharge to: [x] Outpatient PT     [] Home Health PT     [] SNF     Frequency / Duration: Patient to be seen 1 time per week for 1 treatment    G-Codes (GP)  Carry   Current  CK= 40-59%    Goal CK= 40-59%  [id number] D/C  CK= 40-59%    The severity rating is based on clinical judgment. Certification Period: 8/14/2018 - 8/14/2018  Miriam Herr PT, DPT 8/14/2018 10:38 AM    ________________________________________________________________________    I certify that the above Therapy Services are being furnished while the patient is under my care. I agree with the treatment plan and certify that this therapy is necessary.     [de-identified] Signature:____________________  Date:____________Time: _________

## 2018-08-15 LAB
ATRIAL RATE: 84 BPM
BACTERIA SPEC CULT: NORMAL
CALCULATED P AXIS, ECG09: -7 DEGREES
CALCULATED R AXIS, ECG10: 48 DEGREES
CALCULATED T AXIS, ECG11: 72 DEGREES
DIAGNOSIS, 93000: NORMAL
P-R INTERVAL, ECG05: 156 MS
Q-T INTERVAL, ECG07: 384 MS
QRS DURATION, ECG06: 86 MS
QTC CALCULATION (BEZET), ECG08: 453 MS
SERVICE CMNT-IMP: NORMAL
VENTRICULAR RATE, ECG03: 84 BPM

## 2019-01-08 ENCOUNTER — HOSPITAL ENCOUNTER (OUTPATIENT)
Dept: PREADMISSION TESTING | Age: 69
Discharge: HOME | End: 2019-01-08
Payer: MEDICARE

## 2019-01-08 VITALS — WEIGHT: 293 LBS | HEIGHT: 68 IN | BODY MASS INDEX: 44.41 KG/M2

## 2019-01-08 LAB
ANION GAP SERPL CALC-SCNC: 6 MMOL/L (ref 3–18)
BACTERIA SPEC CULT: NORMAL
BASOPHILS # BLD: 0 K/UL (ref 0–0.1)
BASOPHILS NFR BLD: 0 % (ref 0–2)
BUN SERPL-MCNC: 34 MG/DL (ref 7–18)
BUN/CREAT SERPL: 22
CALCIUM SERPL-MCNC: 9.6 MG/DL (ref 8.5–10.1)
CHLORIDE SERPL-SCNC: 104 MMOL/L (ref 100–108)
CO2 SERPL-SCNC: 27 MMOL/L (ref 21–32)
CREAT SERPL-MCNC: 1.55 MG/DL (ref 0.6–1.3)
DIFFERENTIAL METHOD BLD: ABNORMAL
EOSINOPHIL # BLD: 0.2 K/UL (ref 0–0.4)
EOSINOPHIL NFR BLD: 2 % (ref 0–5)
ERYTHROCYTE [DISTWIDTH] IN BLOOD BY AUTOMATED COUNT: 15.7 % (ref 11.6–14.5)
EST. AVERAGE GLUCOSE BLD GHB EST-MCNC: 189 MG/DL
GLUCOSE SERPL-MCNC: 145 MG/DL (ref 74–99)
HBA1C MFR BLD: 8.2 % (ref 4.2–5.6)
HCT VFR BLD AUTO: 36.5 % (ref 35–45)
HGB BLD-MCNC: 11.3 G/DL (ref 12–16)
LYMPHOCYTES # BLD: 3 K/UL (ref 0.9–3.6)
LYMPHOCYTES NFR BLD: 32 % (ref 21–52)
MCH RBC QN AUTO: 28.3 PG (ref 24–34)
MCHC RBC AUTO-ENTMCNC: 31 G/DL (ref 31–37)
MCV RBC AUTO: 91.5 FL (ref 74–97)
MONOCYTES # BLD: 0.7 K/UL (ref 0.05–1.2)
MONOCYTES NFR BLD: 7 % (ref 3–10)
NEUTS SEG # BLD: 5.7 K/UL (ref 1.8–8)
NEUTS SEG NFR BLD: 59 % (ref 40–73)
PLATELET # BLD AUTO: 292 K/UL (ref 135–420)
PMV BLD AUTO: 11.9 FL (ref 9.2–11.8)
POTASSIUM SERPL-SCNC: 4.6 MMOL/L (ref 3.5–5.5)
RBC # BLD AUTO: 3.99 M/UL (ref 4.2–5.3)
SERVICE CMNT-IMP: NORMAL
SODIUM SERPL-SCNC: 137 MMOL/L (ref 136–145)
WBC # BLD AUTO: 9.6 K/UL (ref 4.6–13.2)

## 2019-01-08 PROCEDURE — 87086 URINE CULTURE/COLONY COUNT: CPT

## 2019-01-08 PROCEDURE — 93005 ELECTROCARDIOGRAM TRACING: CPT

## 2019-01-08 PROCEDURE — 85025 COMPLETE CBC W/AUTO DIFF WBC: CPT

## 2019-01-08 PROCEDURE — 87641 MR-STAPH DNA AMP PROBE: CPT

## 2019-01-08 PROCEDURE — 83036 HEMOGLOBIN GLYCOSYLATED A1C: CPT

## 2019-01-08 PROCEDURE — 80048 BASIC METABOLIC PNL TOTAL CA: CPT

## 2019-01-08 RX ORDER — SODIUM CHLORIDE, SODIUM LACTATE, POTASSIUM CHLORIDE, CALCIUM CHLORIDE 600; 310; 30; 20 MG/100ML; MG/100ML; MG/100ML; MG/100ML
125 INJECTION, SOLUTION INTRAVENOUS CONTINUOUS
Status: CANCELLED | OUTPATIENT
Start: 2019-01-08

## 2019-01-08 NOTE — PERIOP NOTES
Denies any prostheticsPatient states family physician is aware of upcoming procedure/surgeryPatient instructed to bring cpap machine in on day of procedure/surgeryDenies family history of anesthesia complications

## 2019-01-09 LAB
ATRIAL RATE: 82 BPM
CALCULATED P AXIS, ECG09: 8 DEGREES
CALCULATED R AXIS, ECG10: 46 DEGREES
CALCULATED T AXIS, ECG11: 74 DEGREES
DIAGNOSIS, 93000: NORMAL
P-R INTERVAL, ECG05: 166 MS
Q-T INTERVAL, ECG07: 398 MS
QRS DURATION, ECG06: 88 MS
QTC CALCULATION (BEZET), ECG08: 464 MS
VENTRICULAR RATE, ECG03: 82 BPM

## 2019-01-09 NOTE — PERIOP NOTES
HgA1c results of 8.2 faxed to Dr. Matt Levine office and left v/m on [employer]. Radha Cowan called, A1c is okay and ekg is being looked at by cardiology and they will sign off by end of week.

## 2019-01-10 LAB
BACTERIA SPEC CULT: NORMAL
SERVICE CMNT-IMP: NORMAL

## 2019-01-20 NOTE — H&P
Patient Name:   Mauricio Mir #:  [de-identified] of Birth:  1950 Chief Complaint:  Left shoulder pain. History of Chief Complaint: This is a 78-year-old woman who complains of pain in her left shoulder. She has been seeing Dr. Mickey Kim, who had her get a MRI of the left shoulder. This study is reviewed. It shows partial-thickness tearing of the supraspinatus with severe glenohumeral DJD and moderate DJD of the AC joint. Past Medical/Surgical History:  Disease/Disorder Type Date Side Surgery Date Side Comment Lumpectomy  right DL 02/16/2018 -x2 benign    Cholecystectomy       Hysterectomy       Hemorrhoidectomy   Hypertension       Diabetes mellitus       High cholesterol       Arthritis           Hip replacement 03/12/2018 right Cancer, thyroid    Thyroidectomy 1999      Knee replacement 2008 left Allergies:  Ingredient Reaction Medication Name [address] Current Medications:  Medication Directionslevothyroxine 200 mcg tablet OneTouch Delica Lancets 33 gauge enalapril maleate 20 mg tablet glipizide ER 5 mg tablet, extended release 24 hr Levemir FlexTouch 100 unit/mL (3 mL) subcutaneous insulin pen Victoza 3-Indra 0.6 mg/0.1 mL (18 mg/3 mL) subcutaneous pen injector allopurinol 100 mg tablet OneTouch Ultra Test strips furosemide 20 mg tablet tranexamic acid 650 mg tablet Take 3 tablets 2 hours prior to surgery Social History:  SMOKINGStatus Tobacco Type Units Per Day Yrs UsedFormer smoker   ALCOHOLThere is a history of alcohol use, consumed rarely. Family History:  Disease Detail Family Member Age Cause of Death CommentsHeart disease Mother  Y  Mother   COPD Father  N Arthritis Brother  N Review of Systems:    Pertinent positives include joint pain and joint stiffness. Pertinent negatives include chills, fever and numbness/tingling. Vitals:Date BP Pulse Temp (F) Resp. (per min.) Height (Total in.) Weight (lbs.) BMI02/20/2018     67.00  44.01 
 
 Physical Examination:General:  Patient in no acute distress. Vital Signs: See database for vital signs. HEENT: Normal.Neck: Supple. Chest: Clear. Heart: Regular sinus rhythm. Abdomen: Soft, nontender, no adenopathy. Neurologic: Normal exam.Extremities:     Physical exam of the left shoulders show limited overall motion, reaching 70 degrees of forward flexion and 60 degrees of abduction passively. She has slight pain and weakness with resisted abduction. Radiograph Examination:  X-rays done previously were reviewed, and they show a bone-on-bone appearance with a large inferior osteophyte. Impression:  Severe degenerative joint disease, left shoulder, with deficient cuff. Plan:  She will be scheduled for a left shoulder reverse total shoulder arthroplasty. She understands the risks of the procedure, and she is ready to proceed. She was given a sling. Postop, she will get Percocet.

## 2019-01-21 ENCOUNTER — HOSPITAL ENCOUNTER (INPATIENT)
Age: 69
LOS: 1 days | Discharge: HOME | DRG: 483 | End: 2019-01-22
Attending: ORTHOPAEDIC SURGERY | Admitting: ORTHOPAEDIC SURGERY
Payer: MEDICARE

## 2019-01-21 ENCOUNTER — APPOINTMENT (OUTPATIENT)
Dept: GENERAL RADIOLOGY | Age: 69
DRG: 483 | End: 2019-01-21
Attending: ORTHOPAEDIC SURGERY
Payer: MEDICARE

## 2019-01-21 ENCOUNTER — ANESTHESIA (OUTPATIENT)
Dept: SURGERY | Age: 69
DRG: 483 | End: 2019-01-21
Payer: MEDICARE

## 2019-01-21 ENCOUNTER — ANESTHESIA EVENT (OUTPATIENT)
Dept: SURGERY | Age: 69
DRG: 483 | End: 2019-01-21
Payer: MEDICARE

## 2019-01-21 DIAGNOSIS — M19.012 PRIMARY OSTEOARTHRITIS OF LEFT SHOULDER: Primary | ICD-10-CM

## 2019-01-21 LAB
ABO + RH BLD: NORMAL
BLOOD GROUP ANTIBODIES SERPL: NORMAL
GLUCOSE BLD STRIP.AUTO-MCNC: 127 MG/DL (ref 70–110)
GLUCOSE BLD STRIP.AUTO-MCNC: 170 MG/DL (ref 70–110)
GLUCOSE BLD STRIP.AUTO-MCNC: 171 MG/DL (ref 70–110)
GLUCOSE BLD STRIP.AUTO-MCNC: 176 MG/DL (ref 70–110)
GLUCOSE BLD STRIP.AUTO-MCNC: 287 MG/DL (ref 70–110)
SPECIMEN EXP DATE BLD: NORMAL

## 2019-01-21 PROCEDURE — 77030018846 HC SOL IRR STRL H20 ICUM -A: Performed by: ORTHOPAEDIC SURGERY

## 2019-01-21 PROCEDURE — 77030008477 HC STYL SATN SLP COVD -A: Performed by: ANESTHESIOLOGY

## 2019-01-21 PROCEDURE — C1776 JOINT DEVICE (IMPLANTABLE): HCPCS | Performed by: ORTHOPAEDIC SURGERY

## 2019-01-21 PROCEDURE — 77030013728 HC IRR FEM CNL STRY -B: Performed by: ORTHOPAEDIC SURGERY

## 2019-01-21 PROCEDURE — 65270000029 HC RM PRIVATE

## 2019-01-21 PROCEDURE — 77030002922 HC SUT FBRWRE ARTH -B: Performed by: ORTHOPAEDIC SURGERY

## 2019-01-21 PROCEDURE — 97161 PT EVAL LOW COMPLEX 20 MIN: CPT

## 2019-01-21 PROCEDURE — 74011250636 HC RX REV CODE- 250/636: Performed by: ORTHOPAEDIC SURGERY

## 2019-01-21 PROCEDURE — 77030033138 HC SUT PGA STRATFX J&J -B: Performed by: ORTHOPAEDIC SURGERY

## 2019-01-21 PROCEDURE — 77030031139 HC SUT VCRL2 J&J -A: Performed by: ORTHOPAEDIC SURGERY

## 2019-01-21 PROCEDURE — 77030036565 HC WRP CLDTHER ANK S2SG -A: Performed by: ORTHOPAEDIC SURGERY

## 2019-01-21 PROCEDURE — 77030020255 HC SOL INJ LR 1000ML BG: Performed by: ORTHOPAEDIC SURGERY

## 2019-01-21 PROCEDURE — 77030032490 HC SLV COMPR SCD KNE COVD -B: Performed by: ORTHOPAEDIC SURGERY

## 2019-01-21 PROCEDURE — 77030037875 HC DRSG MEPILEX <16IN BORD MOLN -A: Performed by: ORTHOPAEDIC SURGERY

## 2019-01-21 PROCEDURE — 74011250636 HC RX REV CODE- 250/636: Performed by: ANESTHESIOLOGY

## 2019-01-21 PROCEDURE — 77030020989 HC NDL NRV BLK ARRO -B: Performed by: ANESTHESIOLOGY

## 2019-01-21 PROCEDURE — 86900 BLOOD TYPING SEROLOGIC ABO: CPT

## 2019-01-21 PROCEDURE — 77030008683 HC TU ET CUF COVD -A: Performed by: ANESTHESIOLOGY

## 2019-01-21 PROCEDURE — 74011000272 HC RX REV CODE- 272: Performed by: ORTHOPAEDIC SURGERY

## 2019-01-21 PROCEDURE — 64415 NJX AA&/STRD BRCH PLXS IMG: CPT | Performed by: ANESTHESIOLOGY

## 2019-01-21 PROCEDURE — 77030006807 HC BLD SAW RECIP KMET -B: Performed by: ORTHOPAEDIC SURGERY

## 2019-01-21 PROCEDURE — 74011636637 HC RX REV CODE- 636/637: Performed by: ANESTHESIOLOGY

## 2019-01-21 PROCEDURE — 77030020256 HC SOL INJ NACL 0.9%  500ML: Performed by: ORTHOPAEDIC SURGERY

## 2019-01-21 PROCEDURE — 82962 GLUCOSE BLOOD TEST: CPT

## 2019-01-21 PROCEDURE — 76010000132 HC OR TIME 2.5 TO 3 HR: Performed by: ORTHOPAEDIC SURGERY

## 2019-01-21 PROCEDURE — 36415 COLL VENOUS BLD VENIPUNCTURE: CPT

## 2019-01-21 PROCEDURE — 77030027138 HC INCENT SPIROMETER -A: Performed by: ORTHOPAEDIC SURGERY

## 2019-01-21 PROCEDURE — 3E0T3BZ INTRODUCTION OF ANESTHETIC AGENT INTO PERIPHERAL NERVES AND PLEXI, PERCUTANEOUS APPROACH: ICD-10-PCS | Performed by: ANESTHESIOLOGY

## 2019-01-21 PROCEDURE — 74011250636 HC RX REV CODE- 250/636

## 2019-01-21 PROCEDURE — 97110 THERAPEUTIC EXERCISES: CPT

## 2019-01-21 PROCEDURE — 77030020782 HC GWN BAIR PAWS FLX 3M -B: Performed by: ORTHOPAEDIC SURGERY

## 2019-01-21 PROCEDURE — 76942 ECHO GUIDE FOR BIOPSY: CPT | Performed by: ORTHOPAEDIC SURGERY

## 2019-01-21 PROCEDURE — 77030013708 HC HNDPC SUC IRR PULS STRY –B: Performed by: ORTHOPAEDIC SURGERY

## 2019-01-21 PROCEDURE — 74011250637 HC RX REV CODE- 250/637: Performed by: ORTHOPAEDIC SURGERY

## 2019-01-21 PROCEDURE — 74011000250 HC RX REV CODE- 250

## 2019-01-21 PROCEDURE — 74011636637 HC RX REV CODE- 636/637: Performed by: ORTHOPAEDIC SURGERY

## 2019-01-21 PROCEDURE — 73020 X-RAY EXAM OF SHOULDER: CPT

## 2019-01-21 PROCEDURE — 74011000250 HC RX REV CODE- 250: Performed by: ORTHOPAEDIC SURGERY

## 2019-01-21 PROCEDURE — 76060000036 HC ANESTHESIA 2.5 TO 3 HR: Performed by: ORTHOPAEDIC SURGERY

## 2019-01-21 PROCEDURE — 76210000000 HC OR PH I REC 2 TO 2.5 HR: Performed by: ORTHOPAEDIC SURGERY

## 2019-01-21 PROCEDURE — 0RRK00Z REPLACEMENT OF LEFT SHOULDER JOINT WITH REVERSE BALL AND SOCKET SYNTHETIC SUBSTITUTE, OPEN APPROACH: ICD-10-PCS | Performed by: ORTHOPAEDIC SURGERY

## 2019-01-21 PROCEDURE — 77030006643: Performed by: ANESTHESIOLOGY

## 2019-01-21 PROCEDURE — 77030013079 HC BLNKT BAIR HGGR 3M -A: Performed by: ANESTHESIOLOGY

## 2019-01-21 DEVICE — IMPLANTABLE DEVICE
Type: IMPLANTABLE DEVICE | Site: SHOULDER | Status: FUNCTIONAL
Brand: EQUINOXE

## 2019-01-21 DEVICE — SHOULDER REV IMPL -- S4 BSV SC: Type: IMPLANTABLE DEVICE | Site: SHOULDER | Status: FUNCTIONAL

## 2019-01-21 RX ORDER — DIPHENHYDRAMINE HYDROCHLORIDE 50 MG/ML
12.5 INJECTION, SOLUTION INTRAMUSCULAR; INTRAVENOUS
Status: DISCONTINUED | OUTPATIENT
Start: 2019-01-21 | End: 2019-01-22 | Stop reason: HOSPADM

## 2019-01-21 RX ORDER — INSULIN GLARGINE 100 [IU]/ML
35 INJECTION, SOLUTION SUBCUTANEOUS
Status: DISCONTINUED | OUTPATIENT
Start: 2019-01-21 | End: 2019-01-22 | Stop reason: HOSPADM

## 2019-01-21 RX ORDER — COLCHICINE 0.6 MG/1
0.6 TABLET ORAL
Status: DISCONTINUED | OUTPATIENT
Start: 2019-01-21 | End: 2019-01-22 | Stop reason: HOSPADM

## 2019-01-21 RX ORDER — SIMVASTATIN 40 MG/1
40 TABLET, FILM COATED ORAL
Status: DISCONTINUED | OUTPATIENT
Start: 2019-01-21 | End: 2019-01-22 | Stop reason: HOSPADM

## 2019-01-21 RX ORDER — ONDANSETRON 2 MG/ML
4 INJECTION INTRAMUSCULAR; INTRAVENOUS
Status: DISCONTINUED | OUTPATIENT
Start: 2019-01-21 | End: 2019-01-22 | Stop reason: HOSPADM

## 2019-01-21 RX ORDER — FENTANYL CITRATE 50 UG/ML
50 INJECTION, SOLUTION INTRAMUSCULAR; INTRAVENOUS
Status: DISCONTINUED | OUTPATIENT
Start: 2019-01-21 | End: 2019-01-21 | Stop reason: HOSPADM

## 2019-01-21 RX ORDER — FENTANYL CITRATE 50 UG/ML
INJECTION, SOLUTION INTRAMUSCULAR; INTRAVENOUS AS NEEDED
Status: DISCONTINUED | OUTPATIENT
Start: 2019-01-21 | End: 2019-01-21 | Stop reason: HOSPADM

## 2019-01-21 RX ORDER — SODIUM CHLORIDE 0.9 % (FLUSH) 0.9 %
5-40 SYRINGE (ML) INJECTION EVERY 8 HOURS
Status: DISCONTINUED | OUTPATIENT
Start: 2019-01-21 | End: 2019-01-22 | Stop reason: HOSPADM

## 2019-01-21 RX ORDER — NALOXONE HYDROCHLORIDE 0.4 MG/ML
0.4 INJECTION, SOLUTION INTRAMUSCULAR; INTRAVENOUS; SUBCUTANEOUS AS NEEDED
Status: DISCONTINUED | OUTPATIENT
Start: 2019-01-21 | End: 2019-01-21 | Stop reason: HOSPADM

## 2019-01-21 RX ORDER — PROPOFOL 10 MG/ML
INJECTION, EMULSION INTRAVENOUS AS NEEDED
Status: DISCONTINUED | OUTPATIENT
Start: 2019-01-21 | End: 2019-01-21 | Stop reason: HOSPADM

## 2019-01-21 RX ORDER — GLYCOPYRROLATE 0.2 MG/ML
INJECTION INTRAMUSCULAR; INTRAVENOUS AS NEEDED
Status: DISCONTINUED | OUTPATIENT
Start: 2019-01-21 | End: 2019-01-21 | Stop reason: HOSPADM

## 2019-01-21 RX ORDER — SODIUM CHLORIDE, SODIUM LACTATE, POTASSIUM CHLORIDE, CALCIUM CHLORIDE 600; 310; 30; 20 MG/100ML; MG/100ML; MG/100ML; MG/100ML
125 INJECTION, SOLUTION INTRAVENOUS CONTINUOUS
Status: DISCONTINUED | OUTPATIENT
Start: 2019-01-21 | End: 2019-01-22 | Stop reason: HOSPADM

## 2019-01-21 RX ORDER — ASPIRIN 81 MG/1
81 TABLET ORAL DAILY
Status: DISCONTINUED | OUTPATIENT
Start: 2019-01-22 | End: 2019-01-22 | Stop reason: HOSPADM

## 2019-01-21 RX ORDER — FUROSEMIDE 20 MG/1
20 TABLET ORAL DAILY
Status: DISCONTINUED | OUTPATIENT
Start: 2019-01-22 | End: 2019-01-22 | Stop reason: HOSPADM

## 2019-01-21 RX ORDER — ROPIVACAINE HYDROCHLORIDE 5 MG/ML
INJECTION, SOLUTION EPIDURAL; INFILTRATION; PERINEURAL AS NEEDED
Status: DISCONTINUED | OUTPATIENT
Start: 2019-01-21 | End: 2019-01-21 | Stop reason: HOSPADM

## 2019-01-21 RX ORDER — GLIPIZIDE 5 MG/1
5 TABLET ORAL DAILY
Status: DISCONTINUED | OUTPATIENT
Start: 2019-01-22 | End: 2019-01-22 | Stop reason: HOSPADM

## 2019-01-21 RX ORDER — INSULIN LISPRO 100 [IU]/ML
INJECTION, SOLUTION INTRAVENOUS; SUBCUTANEOUS
Status: DISCONTINUED | OUTPATIENT
Start: 2019-01-21 | End: 2019-01-22 | Stop reason: HOSPADM

## 2019-01-21 RX ORDER — ACETAMINOPHEN 10 MG/ML
INJECTION, SOLUTION INTRAVENOUS AS NEEDED
Status: DISCONTINUED | OUTPATIENT
Start: 2019-01-21 | End: 2019-01-21 | Stop reason: HOSPADM

## 2019-01-21 RX ORDER — DEXTROSE 50 % IN WATER (D50W) INTRAVENOUS SYRINGE
25-50 AS NEEDED
Status: DISCONTINUED | OUTPATIENT
Start: 2019-01-21 | End: 2019-01-22 | Stop reason: HOSPADM

## 2019-01-21 RX ORDER — ONDANSETRON 2 MG/ML
4 INJECTION INTRAMUSCULAR; INTRAVENOUS ONCE
Status: DISCONTINUED | OUTPATIENT
Start: 2019-01-21 | End: 2019-01-21 | Stop reason: HOSPADM

## 2019-01-21 RX ORDER — MAGNESIUM SULFATE 100 %
4 CRYSTALS MISCELLANEOUS AS NEEDED
Status: DISCONTINUED | OUTPATIENT
Start: 2019-01-21 | End: 2019-01-22 | Stop reason: HOSPADM

## 2019-01-21 RX ORDER — ROCURONIUM BROMIDE 10 MG/ML
INJECTION, SOLUTION INTRAVENOUS AS NEEDED
Status: DISCONTINUED | OUTPATIENT
Start: 2019-01-21 | End: 2019-01-21 | Stop reason: HOSPADM

## 2019-01-21 RX ORDER — FLUMAZENIL 0.1 MG/ML
0.2 INJECTION INTRAVENOUS
Status: DISCONTINUED | OUTPATIENT
Start: 2019-01-21 | End: 2019-01-21 | Stop reason: HOSPADM

## 2019-01-21 RX ORDER — HYDROMORPHONE HYDROCHLORIDE 2 MG/ML
0.5 INJECTION, SOLUTION INTRAMUSCULAR; INTRAVENOUS; SUBCUTANEOUS
Status: DISCONTINUED | OUTPATIENT
Start: 2019-01-21 | End: 2019-01-21 | Stop reason: HOSPADM

## 2019-01-21 RX ORDER — SODIUM CHLORIDE 0.9 % (FLUSH) 0.9 %
5-40 SYRINGE (ML) INJECTION AS NEEDED
Status: DISCONTINUED | OUTPATIENT
Start: 2019-01-21 | End: 2019-01-22 | Stop reason: HOSPADM

## 2019-01-21 RX ORDER — FERROUS SULFATE, DRIED 160(50) MG
1 TABLET, EXTENDED RELEASE ORAL DAILY
Status: DISCONTINUED | OUTPATIENT
Start: 2019-01-22 | End: 2019-01-22 | Stop reason: HOSPADM

## 2019-01-21 RX ORDER — LIDOCAINE HYDROCHLORIDE 20 MG/ML
INJECTION, SOLUTION EPIDURAL; INFILTRATION; INTRACAUDAL; PERINEURAL AS NEEDED
Status: DISCONTINUED | OUTPATIENT
Start: 2019-01-21 | End: 2019-01-21 | Stop reason: HOSPADM

## 2019-01-21 RX ORDER — ALLOPURINOL 100 MG/1
100 TABLET ORAL 2 TIMES DAILY
Status: DISCONTINUED | OUTPATIENT
Start: 2019-01-21 | End: 2019-01-22 | Stop reason: HOSPADM

## 2019-01-21 RX ORDER — NALOXONE HYDROCHLORIDE 0.4 MG/ML
0.1 INJECTION, SOLUTION INTRAMUSCULAR; INTRAVENOUS; SUBCUTANEOUS AS NEEDED
Status: DISCONTINUED | OUTPATIENT
Start: 2019-01-21 | End: 2019-01-22 | Stop reason: HOSPADM

## 2019-01-21 RX ORDER — MIDAZOLAM HYDROCHLORIDE 1 MG/ML
INJECTION, SOLUTION INTRAMUSCULAR; INTRAVENOUS AS NEEDED
Status: DISCONTINUED | OUTPATIENT
Start: 2019-01-21 | End: 2019-01-21 | Stop reason: HOSPADM

## 2019-01-21 RX ORDER — ONDANSETRON 2 MG/ML
INJECTION INTRAMUSCULAR; INTRAVENOUS AS NEEDED
Status: DISCONTINUED | OUTPATIENT
Start: 2019-01-21 | End: 2019-01-21 | Stop reason: HOSPADM

## 2019-01-21 RX ORDER — SODIUM CHLORIDE, SODIUM LACTATE, POTASSIUM CHLORIDE, CALCIUM CHLORIDE 600; 310; 30; 20 MG/100ML; MG/100ML; MG/100ML; MG/100ML
100 INJECTION, SOLUTION INTRAVENOUS CONTINUOUS
Status: DISCONTINUED | OUTPATIENT
Start: 2019-01-21 | End: 2019-01-21 | Stop reason: HOSPADM

## 2019-01-21 RX ORDER — CEFAZOLIN SODIUM 2 G/50ML
2 SOLUTION INTRAVENOUS ONCE
Status: DISCONTINUED | OUTPATIENT
Start: 2019-01-21 | End: 2019-01-21 | Stop reason: DRUGHIGH

## 2019-01-21 RX ORDER — LISINOPRIL 20 MG/1
20 TABLET ORAL 2 TIMES DAILY
Status: DISCONTINUED | OUTPATIENT
Start: 2019-01-21 | End: 2019-01-22 | Stop reason: HOSPADM

## 2019-01-21 RX ORDER — BUPIVACAINE HYDROCHLORIDE AND EPINEPHRINE 5; 5 MG/ML; UG/ML
INJECTION, SOLUTION EPIDURAL; INTRACAUDAL; PERINEURAL AS NEEDED
Status: DISCONTINUED | OUTPATIENT
Start: 2019-01-21 | End: 2019-01-21 | Stop reason: HOSPADM

## 2019-01-21 RX ORDER — LEVOTHYROXINE SODIUM 100 UG/1
200 TABLET ORAL
Status: DISCONTINUED | OUTPATIENT
Start: 2019-01-22 | End: 2019-01-22 | Stop reason: HOSPADM

## 2019-01-21 RX ORDER — INSULIN LISPRO 100 [IU]/ML
INJECTION, SOLUTION INTRAVENOUS; SUBCUTANEOUS ONCE
Status: COMPLETED | OUTPATIENT
Start: 2019-01-21 | End: 2019-01-21

## 2019-01-21 RX ORDER — NEOSTIGMINE METHYLSULFATE 5 MG/5 ML
SYRINGE (ML) INTRAVENOUS AS NEEDED
Status: DISCONTINUED | OUTPATIENT
Start: 2019-01-21 | End: 2019-01-21 | Stop reason: HOSPADM

## 2019-01-21 RX ADMIN — ACETAMINOPHEN 1000 MG: 10 INJECTION, SOLUTION INTRAVENOUS at 09:55

## 2019-01-21 RX ADMIN — ROCURONIUM BROMIDE 35 MG: 10 INJECTION, SOLUTION INTRAVENOUS at 07:38

## 2019-01-21 RX ADMIN — ROCURONIUM BROMIDE 20 MG: 10 INJECTION, SOLUTION INTRAVENOUS at 09:17

## 2019-01-21 RX ADMIN — LISINOPRIL 20 MG: 20 TABLET ORAL at 23:17

## 2019-01-21 RX ADMIN — CEFAZOLIN SODIUM 3 G: 10 INJECTION, POWDER, FOR SOLUTION INTRAVENOUS at 15:51

## 2019-01-21 RX ADMIN — SIMVASTATIN 40 MG: 40 TABLET, FILM COATED ORAL at 23:17

## 2019-01-21 RX ADMIN — ONDANSETRON 4 MG: 2 INJECTION INTRAMUSCULAR; INTRAVENOUS at 08:55

## 2019-01-21 RX ADMIN — SODIUM CHLORIDE, SODIUM LACTATE, POTASSIUM CHLORIDE, AND CALCIUM CHLORIDE 125 ML/HR: 600; 310; 30; 20 INJECTION, SOLUTION INTRAVENOUS at 12:23

## 2019-01-21 RX ADMIN — ROPIVACAINE HYDROCHLORIDE 30 ML: 5 INJECTION, SOLUTION EPIDURAL; INFILTRATION; PERINEURAL at 07:24

## 2019-01-21 RX ADMIN — SODIUM CHLORIDE, SODIUM LACTATE, POTASSIUM CHLORIDE, AND CALCIUM CHLORIDE 100 ML/HR: 600; 310; 30; 20 INJECTION, SOLUTION INTRAVENOUS at 10:34

## 2019-01-21 RX ADMIN — SODIUM CHLORIDE, SODIUM LACTATE, POTASSIUM CHLORIDE, AND CALCIUM CHLORIDE 125 ML/HR: 600; 310; 30; 20 INJECTION, SOLUTION INTRAVENOUS at 06:24

## 2019-01-21 RX ADMIN — INSULIN GLARGINE 35 UNITS: 100 INJECTION, SOLUTION SUBCUTANEOUS at 23:18

## 2019-01-21 RX ADMIN — INSULIN LISPRO 2 UNITS: 100 INJECTION, SOLUTION INTRAVENOUS; SUBCUTANEOUS at 17:42

## 2019-01-21 RX ADMIN — Medication 3 MG: at 09:53

## 2019-01-21 RX ADMIN — LIDOCAINE HYDROCHLORIDE 100 MG: 20 INJECTION, SOLUTION EPIDURAL; INFILTRATION; INTRACAUDAL; PERINEURAL at 07:38

## 2019-01-21 RX ADMIN — CEFAZOLIN SODIUM 3 G: 10 INJECTION, POWDER, FOR SOLUTION INTRAVENOUS at 07:56

## 2019-01-21 RX ADMIN — FENTANYL CITRATE 100 MCG: 50 INJECTION, SOLUTION INTRAMUSCULAR; INTRAVENOUS at 07:38

## 2019-01-21 RX ADMIN — SODIUM CHLORIDE, SODIUM LACTATE, POTASSIUM CHLORIDE, AND CALCIUM CHLORIDE 125 ML/HR: 600; 310; 30; 20 INJECTION, SOLUTION INTRAVENOUS at 19:44

## 2019-01-21 RX ADMIN — CEFAZOLIN SODIUM 3 G: 10 INJECTION, POWDER, FOR SOLUTION INTRAVENOUS at 23:18

## 2019-01-21 RX ADMIN — FENTANYL CITRATE 100 MCG: 50 INJECTION, SOLUTION INTRAMUSCULAR; INTRAVENOUS at 07:21

## 2019-01-21 RX ADMIN — HYDROMORPHONE HYDROCHLORIDE: 10 INJECTION, SOLUTION INTRAMUSCULAR; INTRAVENOUS; SUBCUTANEOUS at 12:57

## 2019-01-21 RX ADMIN — MIDAZOLAM HYDROCHLORIDE 2 MG: 1 INJECTION, SOLUTION INTRAMUSCULAR; INTRAVENOUS at 07:21

## 2019-01-21 RX ADMIN — INSULIN LISPRO 6 UNITS: 100 INJECTION, SOLUTION INTRAVENOUS; SUBCUTANEOUS at 23:18

## 2019-01-21 RX ADMIN — ROCURONIUM BROMIDE 15 MG: 10 INJECTION, SOLUTION INTRAVENOUS at 08:35

## 2019-01-21 RX ADMIN — GLYCOPYRROLATE 0.4 MG: 0.2 INJECTION INTRAMUSCULAR; INTRAVENOUS at 09:53

## 2019-01-21 RX ADMIN — PROPOFOL 120 MG: 10 INJECTION, EMULSION INTRAVENOUS at 07:38

## 2019-01-21 RX ADMIN — INSULIN LISPRO 3 UNITS: 100 INJECTION, SOLUTION INTRAVENOUS; SUBCUTANEOUS at 07:28

## 2019-01-21 RX ADMIN — ALLOPURINOL 100 MG: 100 TABLET ORAL at 23:17

## 2019-01-21 NOTE — PERIOP NOTES
Verbal orders from Dr Angelica Toure and Dr. Anu Cota confirmed that a repeat type and screen lab does not need to be drawn. Nadeen Oneal CRNA in room at time of verification and witness to D/C'd need for type and screen lab.

## 2019-01-21 NOTE — PROGRESS NOTES
1:51 PM 
Received client from PACU in satisfactory condition via stretcher. Pt ambulated in room then transferred to bed. . Client is a pt of Dr. Brannon Curran. Pt had a Left total shoulder Arthroplasty today. Client is A/O X 4. Client is calm and cooperative. Clients PERRLA. Pt has numbness to left index finger. Pt has no sensation to left shoulder. Pt has left shoulder sling on. With + Posterior Tibial and Dorsalis Pedis pulses. Capillary Refill less than 3 seconds. Skin is warm , dry and skin color is appropriate to race. Client is negative for JVD. Bibasilar breath sounds clear. No use of accessory muscles. Bowel sounds hypoactive to all quadrants. Abdomen is soft and non-tender. Client has not voided post-operatively. No bladder distention evident. No complaints of bladder discomfort. Client has a Mepilex dressing to the left  shoulder. Dressing is dry and intact. No* other skin integrity issues present. Domenico hose applied to both legs. Sequential compression device applied. Client has RAC20 gauge PIV present and running LR at 125 ml/hr. Clients pain is 0/10 on 0-10 scale. Pt has a PCA Dilaudid with bolus dose only but has not started using it yet. Client oriented to call bell use as well as bed use. Client oriented to phone and how to order meals. Call bell within reach. Bed in low position. Three side rails up. Dual skin check done with Mane Brady RN. No skin breakdown noted. 2:56 PM 
 Eating lunch. Family at bedside. Denies pain. 3:52 PM  
Pt is awake, alert, oriented x 4. No pain and has not started using PCA. Still has numbness to index finger on left. 1640 Pt was seen by PT. Ambulated in the room then voided 250 ml of clear yellow urine via BSC.  
1900 Pt ate well, about 90 % of her food.

## 2019-01-21 NOTE — ROUTINE PROCESS
[address] [facility] TRANSFER - IN REPORT: 
 
Verbal report received from [facility] [facility] [address], RN(name) on [place]  being received from [facility]) for routine post - op Report consisted of patients Situation, Background, Assessment and  
Recommendations(SBAR). Information from the following report(s) SBAR, Kardex, [facility] and Recent Results was reviewed with the receiving nurse. Opportunity for questions and clarification was provided. Assessment completed upon patients arrival to unit and care assumed.

## 2019-01-21 NOTE — ANESTHESIA PREPROCEDURE EVALUATION
Anesthetic History No history of anesthetic complications Review of Systems / Medical History Patient summary reviewed, nursing notes reviewed and pertinent labs reviewed Pulmonary Sleep apnea: CPAP Pertinent negatives: No COPD, asthma and recent URI Comments: Former smoker Neuro/Psych Within defined limits Cardiovascular Hypertension Pertinent negatives: No past MI, CAD, PAD, dysrhythmias, angina and CHF Exercise tolerance: >4 METS 
  
GI/Hepatic/Renal 
  
 
Hepatitis: type C Liver disease Pertinent negatives: No GERD Endo/Other Diabetes Hypothyroidism Morbid obesity, arthritis and cancer Pertinent negatives: No hyperthyroidism and blood dyscrasia Other Findings Comments: S/p thyroidectomy Physical Exam 
 
Airway Mallampati: III 
TM Distance: 4 - 6 cm Neck ROM: normal range of motion Mouth opening: Normal 
 
 Cardiovascular Regular rate and rhythm,  S1 and S2 normal,  no murmur, click, rub, or gallop Dental 
 
Dentition: Edentulous Pulmonary Breath sounds clear to auscultation Abdominal 
GI exam deferred Other Findings Anesthetic Plan ASA: 3 Anesthesia type: general and regional - interscalene block Induction: Intravenous Anesthetic plan and risks discussed with: Patient and Family GA/OETT with single shot ISB for postop pain control

## 2019-01-21 NOTE — PERIOP NOTES
TRANSFER - OUT REPORT: 
 
Verbal report given to Iván Flowers on Cindy  being transferred to [address] [place] [address] for routine progression of care Report consisted of patients Situation, Background, Assessment and  
Recommendations(SBAR). Information from the following report(s) SBAR was reviewed with the receiving nurse. Lines:  
Peripheral IV 01/21/19 Right Antecubital (Active) Visit Vitals /59 Pulse 79 Temp 97.6 °F (36.4 °C) Resp 18 Ht 5' 6.5\" (1.689 m) Wt 137.7 kg (303 lb 9 oz) SpO2 96% BMI 48.26 kg/m² Intake/Output Summary (Last 24 hours) at 1/21/2019 1308 Last data filed at 1/21/2019 1308 Gross per 24 hour Intake 2820 ml Output 400 ml Net 2420 ml Opportunity for questions and clarification was provided. Patient transported with: 
 O2 @ 2 liters Registered Nurse Harini Bernard RN

## 2019-01-21 NOTE — DIABETES MGMT
NUTRITIONAL ASSESSMENT GLYCEMIC CONTROL/ PLAN OF CARE Jeffrey Sierra           77 y.o.           1/21/2019 PMhx: Diabetes, HTN, High Cholesterol, Cancer ( thyroid) Brief update: Pt underwent Cuff Tear Arthroplasty of Left Shoulder. INTERVENTIONS/PLAN:  
-Recommend continuing Diabetes Consistent Carbohydrate Diet.  
-Provide Diabetes Self-Mgt Education ASSESSMENT:  
Nutritional Status: 
Obese class III per BMI 48.3kg/m2 (>40.0 kg/m2) Diabetes Management:  
Recent Glucose Results:  
Lab Results Component Value Date/Time GLUCPOC 127 (H) 01/21/2019 10:28 AM  
 GLUCPOC 170 (H) 01/21/2019 07:16 AM  
 GLUCPOC 176 (H) 01/21/2019 06:23 AM  
. Within target range (non-ICU: <140; ICU<180): [] Yes   [x]  No 
 
Current Insulin regimen: 
Lantus 35 units Humalog, corrective, normal insulin sensitivity Glipizide 5 mg daily Home medication/insulin regimen:  
Glipizide 5 mg daily Levemir 35 units nightly  
victoza 0.6 mg 1.8 mg daily HbA1c:(1/8/19) 8.2% equivalent to average blood glucose level 189 mg/dL. Adequate glycemic control PTA:  [] Yes  [x] No 
  
 
SUBJECTIVE/OBJECTIVE: Information obtained from: Pt and 2 family members present at time of visit. Pt confirmed PTA diabetes medication. Current A1C shared w/ pt. Pt was eating dinner. Will continue to monitor and follow-up per policy. Diet: DIET DIABETIC WITH OPTIONS No data found. Medications: [x]                Reviewed  
synthyroid Most Recent POC Glucose: No results for input(s): GLU in the last 72 hours. Labs:  
Lab Results Component Value Date/Time Hemoglobin A1c 8.2 (H) 01/08/2019 10:30 AM  
 
Lab Results Component Value Date/Time  Sodium 137 01/08/2019 11:30 AM  
 Potassium 4.6 01/08/2019 11:30 AM  
 Chloride 104 01/08/2019 11:30 AM  
 CO2 27 01/08/2019 11:30 AM  
 Anion gap 6 01/08/2019 11:30 AM  
 Glucose 145 (H) 01/08/2019 11:30 AM  
 BUN 34 (H) 01/08/2019 11:30 AM  
 Creatinine 1.55 (H) 01/08/2019 11:30 AM  
 Calcium 9.6 01/08/2019 11:30 AM  
 Magnesium 1.6 03/12/2018 11:55 PM  
 Albumin 3.6 08/13/2018 11:50 AM  
 
 
Anthropometrics BMI (calculated): 48.3 kg/m2 Wt Readings from Last 1 Encounters:  
01/21/19 137.7 kg (303 lb 9 oz) Ht Readings from Last 1 Encounters:  
01/21/19 5' 6.5\" (1.689 m) Estimated Nutrition Needs: 1830 kcal/day (23 kcal/kg), 80 g PRO/day ( 1 g PRO/kg), 1830 mL fluid/day (1830 mL fluid/ kcal) Based on:    [x]            Adjusted BW: 79.6 kg Nutrition Diagnoses: Altered nutrition-related lab value related to excess carbohydrate intake as evidenced by A1C 8.2% . Nutrition Interventions: 
-Recommend continuing Diabetic Consistent Carb Diet Goal:  
Blood glucose will be within target range of  mg/dL by 1/24/19. Nutrition Monitoring and Evaluation []     Monitor po intake on meal rounds 
[x]     Continue inpatient monitoring and intervention 
[]     Other: 
 
 
Nutrition Risk:  []   High     []  Moderate    [x]  Minimal/Uncompromised [place] 
pgr [phone]

## 2019-01-21 NOTE — DIABETES MGMT
Diabetes Patient/Family Education RecordFactors That  May Influence Patients Ability  to Learn or  Comply with Recommendations []   Language barrier    []   Cultural needs   []   Motivation  
 []   Cognitive limitation    []   Physical   []   Education  
 []   Physiological factors   []   Hearing/vision/speaking impairment   []   Yazidi beliefs []   Financial factors   []  Other:   [x]  No factors identified at this time. Person Instructed: [x]   Patient   [x]   Family   []  Other Preference for Learning: 
 [x]   Verbal   []   Written   []  Demonstration Level of Comprehension & Competence:   
[x]  Good                                      [] Fair                                     []  Poor                             []  Needs Reinforcement  
[x]  Teachback completed Education Component:  
[x]  Medication management, including confirmation of home regimen   
[]  Nutritional management -obtain usual meal pattern  
[]  Exercise  
[]  Signs, symptoms, and treatment of hyperglycemia and hypoglycemia  
[] Prevention, recognition and treatment of hyperglycemia and hypoglycemia  
[]  Importance of blood glucose monitoring and how to obtain a blood glucose meter   
[]  Instruction on use of the blood glucose meter [x]  Discuss the importance of HbA1C monitoring; pt reports A1c usually in the 7% range  
[]  Sick day guidelines  
[]  Proper use and disposal of lancets, needles, syringes or insulin pens (if appropriate) []  Potential long-term complications (retinopathy, kidney disease, neuropathy, foot care)  
[] Information about whom to contact in case of emergency or for more information   
[x]  Goal:  Patient/family will demonstrate understanding of Diabetes Self Management Skills by: 3/30 Plan for post-discharge education or self-management support: 
  [] Outpatient class schedule provided            [] Patient Declined [] Scheduled for outpatient classes (date) _______ Verify: 
Does patient understand how diabetes medications work? ____________________________ Does patient know what their most recent A1c is? ___________________________________ Does patient monitor glucose at home? ___________________________________________ Does patient have a glucometer, testing supplies or difficulty obtaining diabetes medications? _________________ Bakari Glass MS, RN, CDE Glycemic Control Team 
[phone] Pager [phone] (- 8:00-4:30P) *After Hours pager [phone]

## 2019-01-21 NOTE — PERIOP NOTES
Patient transferred to room 210. Marbella Vera RN assuming care. Blood pressure 129/65, pulse 94, temperature 98.5 °F (36.9 °C), resp. rate 20, height 5' 6.5\" (1.689 m), weight 137.7 kg (303 lb 9 oz), SpO2 96 %. Dual skin assessment completed.

## 2019-01-21 NOTE — PROGRESS NOTES
Problem: Mobility Impaired (Adult and Pediatric) Goal: *Acute Goals and Plan of Care (Insert Text) Goals to be addressed in 1-4 days: STG 
1. Rolling L to R to L modified independent for positioning. 2. Supine to sit to supine S with HR for meals. 3. Sit to stand to sit S with RW in prep for ambulation. LTG 1. Ambulate >150ft S with RW, WBAT, for home/community mobility. 2. Ascend/descend a >6 stair steps CGA with HR for home entry. 3. Tolerate up in chair 1-2 hours for ADLs. 4. Patient/family to be independent with HEP for follow-up care and safe discharge. Outcome: Progressing Towards Goal 
physical Therapy EVALUATION Patient: Jeffrey Sierra [de-identified]77 y.o. female) Date: 1/21/2019 Primary Diagnosis: LEFT SHOULDER DEGENERATIVE JOINT DISEASE 
DJD of left shoulder Procedure(s) (LRB): 
EXACTECH REVERSE TOTAL LEFT SHOULDER ARTHROPLASTY GENERAL ANESTHESIA W/SCALENE BLOCK PRN (Left) Day of Surgery Precautions:   Fall ASSESSMENT : 
Based on the objective data described below, the patient presents with lower extremity weakness, decreased gait quality and endurance, impaired bed mobility and transfers, and overall limitations in functional mobility s/p L reverse TSR. Pt performed supine to sit with Crystal, sit to stand with Crystal. Patient ambulated 15 feet with  HHA, GB applied, CGA. Pt tolerated session well as evidenced by no c/o increased pain, no lightheadedness or dizziness. Patient would benefit from skilled inpatient physical therapy to address deficits, progress as tolerated to achieve long term goals and allow safe discharge. Patient will benefit from skilled intervention to address the above impairments. Patients rehabilitation potential is considered to be Good Factors which may influence rehabilitation potential include:  
[]         None noted 
[]         Mental ability/status [x]         Medical condition 
[]         Home/family situation and support systems 
[]         Safety awareness [x]         Pain tolerance/management 
[]         Other: PLAN : 
Recommendations and Planned Interventions: 
[x]           Bed Mobility Training             [x]    Neuromuscular Re-Education 
[x]           Transfer Training                   []    Orthotic/Prosthetic Training 
[x]           Gait Training                          []    Modalities [x]           Therapeutic Exercises          []    Edema Management/Control 
[x]           Therapeutic Activities            [x]    Patient and Family Training/Education 
[]           Other (comment): Frequency/Duration: Patient will be followed by physical therapy twice daily to address goals. Discharge Recommendations: Home Health Further Equipment Recommendations for Discharge: N/A  
 
SUBJECTIVE:  
Patient stated I am doing ok, feels good to stand.  OBJECTIVE DATA SUMMARY:  
 
Past Medical History:  
Diagnosis Date  Adverse effect of anesthesia   
 panic attack  Cancer [facility]) 2005 thyroid  Chronic kidney disease   
 stage 3  
 Diabetes ([facility] [address] 1990's  Gout  Hypercholesterolemia  Hypertension 1990's  Liver disease 1990's Hep C (cured)  Morbid obesity ([facility] [address] 46.6  
 OA (osteoarthritis)  Sleep apnea   
 uses CPAP  Thyroid disease 2005  
 cancer Past Surgical History:  
Procedure Laterality Date  HX BREAST LUMPECTOMY Right X 2  
 HX HEENT  2005  
 total thyroidectomy and parathyroid  HX HYSTERECTOMY    
 BSO  
 HX KNEE REPLACEMENT Left 2000  HX OPEN CHOLECYSTECTOMY  HX ORTHOPAEDIC Right 03/12/2018  
 hip replacement  HX OTHER SURGICAL    
 hemorrhoidectomy Barriers to Learning/Limitations: None Compensate with: Visual Cues, Verbal Cues and Tactile Cues Prior Level of Function/Home Situation: Independent amb s/AD Home Situation Home Environment: Private residence # Steps to Enter: 6 Rails to Enter: Yes Hand Rails : Bilateral 
One/Two Story Residence: Two story # of Interior Steps: 15 Height of Each Step (in): 8 inches Interior Rails: Both Lift Chair Available: No 
Living Alone: No 
Support Systems: Family member(s) Patient Expects to be Discharged to[de-identified] Private residence Current DME Used/Available at Home: Cane, straightStrength:   
Strength: Generally decreased, functional 
Tone & Sensation:  
Tone: Normal 
Sensation: Impaired Range Of Motion: 
AROM: Generally decreased, functional 
PROM: Generally decreased, functional 
Functional Mobility: 
Bed Mobility: 
Supine to Sit: Minimum assistance Sit to Supine: Minimum assistance Transfers: 
Sit to Stand: Minimum assistance Stand to Sit: Minimum assistance Balance:  
Sitting: Intact Standing: Intact; With supportAmbulation/Gait Training: 
Distance (ft): 15 Feet (ft) Assistive Device: Gait belt Ambulation - Level of Assistance: Contact guard assistance Gait Description (WDL): Exceptions to [facility] Gait Abnormalities: Decreased step clearance Base of Support: Widened Stance: Weight shift Speed/Ewa: Slow Step Length: Right shortened;Left shortened Pain: 
Pain Scale 1: Numeric (0 - 10) Pain Intensity 1: 0 Activity Tolerance:  
Good Please refer to the flowsheet for vital signs taken during this treatment. After treatment:  
[]         Patient left in no apparent distress sitting up in chair 
[x]         Patient left in no apparent distress in bed 
[x]         Call bell left within reach [x]         Nursing notified 
[]         Caregiver present 
[]         Bed alarm activated COMMUNICATION/EDUCATION:  
[x]         Fall prevention education was provided and the patient/caregiver indicated understanding. [x]         Patient/family have participated as able in goal setting and plan of care. [x]         Patient/family agree to work toward stated goals and plan of care. []         Patient understands intent and goals of therapy, but is neutral about his/her participation. []         Patient is unable to participate in goal setting and plan of care. Thank you for this referral. 
Lear Forward Time Calculation: 23 mins Eval Complexity: History: MEDIUM  Complexity : 1-2 comorbidities / personal factors will impact the outcome/ POC Exam:MEDIUM Complexity : 3 Standardized tests and measures addressing body structure, function, activity limitation and / or participation in recreation  Presentation: LOW Complexity : Stable, uncomplicated  Clinical Decision Making:Low Complexity amb<30 ft s/AD, CG-Crystal Overall Complexity:LOW

## 2019-01-21 NOTE — PERIOP NOTES
IV infiltrated to right forearm. IV removed with catheter intact. Attempt x2 to place new IV unsuccessful. Shelley RN at bedside for IV insertion.

## 2019-01-21 NOTE — ANESTHESIA PROCEDURE NOTES
Peripheral Block Start time: 1/21/2019 7:21 AM 
End time: 1/21/2019 7:26 AM 
Performed by: Baldemar Hernandez DO Authorized by: Baldemar Hernandez DO  
 
 
Pre-procedure: Indications: at surgeon's request and post-op pain management Preanesthetic Checklist: patient identified, risks and benefits discussed, site marked, timeout performed, anesthesia consent given and patient being monitored Timeout Time: 07:21 Block Type:  
Block Type: Interscalene Laterality:  Left Monitoring:  Standard ASA monitoring, continuous pulse ox, frequent vital sign checks, heart rate, oxygen and responsive to questions Injection Technique:  Single shot Procedures: ultrasound guided and nerve stimulator Patient Position: supine (hob 30 degrees) Prep: chlorhexidine Location:  Interscalene Needle Type:  Stimuplex Needle Gauge:  21 G Needle Localization:  Ultrasound guidance and nerve stimulator Motor Response: minimal motor response >0.4 mA Assessment: 
Number of attempts:  1 Injection Assessment:  Incremental injection every 5 mL, local visualized surrounding nerve on ultrasound, negative aspiration for CSF, negative aspiration for blood, no paresthesia, no intravascular symptoms and ultrasound image on chart Patient tolerance:  Patient tolerated the procedure well with no immediate complications

## 2019-01-21 NOTE — PERIOP NOTES
Patient connected to PCA pump per Aleksandra Haynes RN request on 2-south. PCA verified with Yudi Arzate and Adeline FAITH.

## 2019-01-21 NOTE — DIABETES MGMT
NUTRITION / GLYCEMIC CONTROL PLAN OF CARE 
-known h/o T2DM, HbA1c not within recommended range for age + comorbids basal + GLP1 +oral home regimen 
-previous admission pt on regimen of Lantus 10 units at bedtime, with lower A1c, poor po intake, blood loss & tachycardia; this admission A1c has increased and pt appears to have a good appetite as this writer observed pt siting up in bed and eating late lunch Diabetes Management:  
 - recommend: monitor PO intake at dinner if inadequate adjust Lantus by 30% to 25 units  
-RN on floor notified of above recommendation 
- education: 
- goals: *BG will be in target range of  mg/dl (non-ICU) by 1/28 *PO intake will be 75% of meals offered by 1/28 *Pt will follow up with OP PCP for Diabetes Management by 3/30 
- TDD: 3 units - Humalog Very Insulin Resistant Corrective Coverage - BG range: 127-176 mg/dL - Hypo: no 
- BG in target range (non-ICU: <140; ICU<180): [] Yes  [x] No 
- Steroids: no 
Current Insulin regimen:  
Lantus 35 units at bedtime - Humalog Normal Insulin Sensitivity Corrective Coverage Home medication/insulin regimen: 
Levemir 35 units daily Victoza 1.8 units daily Glipizide 5 mg daily Recent Glucose Results:  
Lab Results Component Value Date/Time GLUCPOC 127 (H) 01/21/2019 10:28 AM  
 GLUCPOC 170 (H) 01/21/2019 07:16 AM  
 GLUCPOC 176 (H) 01/21/2019 06:23 AM  
 
 
Adequate glycemic control PTA:  [] Yes  [x] No 
 
HbA1c: equivalent  to ave BGlucose of 189 mg/dl for 2-3 months prior to admission Lab Results Component Value Date/Time Hemoglobin A1c 8.2 (H) 01/08/2019 10:30 AM  
Diet:  
Active Orders Diet DIET DIABETIC WITH OPTIONS Consistent Carb 1800kcal; Regular Keily Rey MS, RN, CDE Glycemic Control Team 
[phone] Pager [phone] (M-TH 8:00-4:30P) *After Hours pager [phone]

## 2019-01-21 NOTE — PERIOP NOTES
Reviewed PTA medication list with patient/caregiver and patient/caregiver denies any additional medications. Patient admits to having a responsible adult care for them at home for at least 24 hours after surgery. Pt denies having an active cough and confirms being able to lie still in the supine position for at least 20 minutes. Patient denies judah chewing/swallowing difficulties. Dual skin assessment & fall risk band verification completed with Juanita Thomas RN.

## 2019-01-21 NOTE — ANESTHESIA POSTPROCEDURE EVALUATION
Post-Anesthesia Evaluation & Assessment Visit Vitals /58 Pulse 80 Temp 36.1 °C (97 °F) Resp 18 Ht 5' 6.5\" (1.689 m) Wt 137.7 kg (303 lb 9 oz) SpO2 96% BMI 48.26 kg/m² Nausea/Vomiting: Controlled. Post-operative hydration adequate. Pain Scale 1: Numeric (0 - 10) (01/21/19 1225) Pain Intensity 1: 0 (01/21/19 1225) Managed Pain score at or below stated goal level. Mental status & Level of consciousness: alert and oriented x 3 Neurological status: left upper extremity weakness secondary to nerve block Pulmonary status: airway patent, adequate oxygenation. Complications related to anesthesia: none Patient has met all PACU discharge requirements.  
 
 
Valente Pardo DO

## 2019-01-21 NOTE — OP NOTES
PREOPERATIVE DIAGNOSIS: Cuff Tear Arthropathy, left shoulder. POSTOPERATIVE DIAGNOSIS: Cuff Tear Arthropathy, left shoulder. PROCEDURE: left Reverse Total Shoulder Arthroplasty [employer]    IMPLANTS:   Implant Name Type Inv. Item Serial No.  Lot No. LRB No. Used Action   SCR TORQUE DEFINING KIT -- Chikis Pates - [id number]  SCR TORQUE DEFINING KIT -- Chikis Pates [id number] [employer]  Left 1 Implanted   PLATE SAMMY HUM ADPT REV +5MM -- Chikis Pates - [id number]  PLATE SAMMY HUM ADPT REV +5MM -- Chikis Pates [id number] [employer]  Left 1 Implanted   SCR BNE LCK GLENOSPHERE -- [employer] - [id number]  SCR BNE LCK GLENOSPHERE -- EQUINOXE [id number] [employer]  Left 1 Implanted   SCR LCK CAP KIT 4.5X30MM MECHELLE -- EQUINOXE - [id number]  SCR LCK CAP KIT 4.5X30MM MECHELLE -- EQUINOXE [id number] [employer]  Left 1 Implanted   SCR LCK CAP KIT 4.5X18MM WHT -- EQUINOXE - [id number]  SCR LCK CAP KIT 4.5X18MM WHT -- EQUINOXE [id number] [employer]  Left 1 Implanted   SCR LCK CAP KIT 4.5X30MM MECHELLE -- EQUINOXE - [id number]  SCR LCK CAP KIT 4.5X30MM MECHELLE -- EQUINOXE [id number] [employer]  Left 1 Implanted   HEAD GLENOSPHERE REV 38MM -- EQUINOXE - [id number]  HEAD GLENOSPHERE REV 38MM -- EQUINOXE [id number] [employer]  Left 1 Implanted   TRAY HUM ADPT REV +0 -- EQUINOXE - [id number]  TRAY HUM ADPT REV +0 -- Chikis Pates [id number] [employer]  Left 1 Implanted   humeral stem   [id number]   Left 1 Implanted   LINER HUM REV 38MM +0 -- EQUIN[employer]E - [id number]  LINER HUM REV 38MM +0 -- EQUINOXE [id number] [employer]  Left 1 Implanted       SURGEON: Sanna Strickland MD    ANESTHESIOLOGIST: Anesthesiologist: Tejal Monteiro, DO  CRNA: Jesusita Garcia CRNA     ASSISTANTS: Circ-1: Jessica Douse  Circ-Relief: Lisbeth Nones, RN  Scrub Tech-1: Dana Day  Scrub Tech-2: Miller Medeiros  Scrub Private/Assistant: Mary Jane Modi CNA    ANAESTHESIA: general anesthesia after scalene block. COMPLICATIONS: None. ESTIMATED BLOOD LOSS: 150 cc     DRAINS:  None.     DESCRIPTION OF PROCEDURE: This 77 y. o.year-old female  with a history of persistent left shoulder pain and weakness, had a history of an unrepairable rotator cuff tear and severe DJD of the glenohumeral joint. The patient then opted to proceed with reconstructive surgery. The patient was taken to the operating room and placed in the beach-chair position, and the left shoulder was prepped and draped in a normal manner. The skin was injected with 0.5% Marcaine with epinephrine. An oblique incision was made over the anterior aspect of the shoulder. The incision was carried through the subcutaneous tissue through the deep fascia and the cephalic vein was retracted laterally. The dissection was carried around the humeral head. The biceps was dissected free, tenodesed distally with #2 FiberWire suture and the intraarticular portion was excised. The lesser tuberosity was osteotomized and the subscapularis was freed circumferentially and allowed to retract medially with stay sutures of #2 FiberWire. The humeral head was dislocated, and it was noted to have severe arthritic changes, along with a massive unrepairable tear of the rotator cuff. The head was osteotomized using a saw guide. The shaft was prepared using sequential reamers. The humeral head was retracted inferiorly and posteriorly. Attention was then turned to the glenoid, which showed severe arthritic changes. The labrum was excised circumferentially. The glenoid reamer was then used and finally the metaglene was placed in position, it was then secured using 3 screws, all of which were locking. The  glenosphere was then screwed in position. The proximal humerus was then prepared and trials were placed in position with sequential reductions. The trials were removed. The shaft was irrigated using antiobiotic solution via pulsed lavage. The #2 FiberWire sutures were passed through the area of the lesser tuberosity, and the sutures were passed around the humeral component. Trial reductions were again performed. The final humeral  component was placed in position. The shoulder was reduced. Good motion with no sign of instability was confirmed. The wound was thoroughly irrigated with antibiotic solution. Hemostasis was assured using a Bovie. The lesser tuberosity was reattached using the #2 FiberWire sutures. The delto-pectoral interval was closed using 0 Vicryl suture. The subcutaneous tissue was closed with 2-0 vicryl and the skin with a Quill stitch. A sterile compressive dressing was applied, along with a sling. The patient left the operating room in satisfactory condition.

## 2019-01-21 NOTE — PROGRESS NOTES
Occupational Therapy Screening: 
Services are indicated. Evaluate and Treat Order is requested. An InBasket screening referral was triggered for occupational therapy based on results obtained during the nursing admission assessment. The patients chart was reviewed and the patient is appropriate for a skilled therapy evaluation. Patient admitted for elective R reverse TSA on dominant UE. Pt would benefit from compensatory strategies for ADLs. Please order a consult for occupational therapy if you are in agreement and would like an evaluation to be completed. Thank you.  
Katarina Almanza, OTR/L

## 2019-01-21 NOTE — INTERVAL H&P NOTE
H&P Update: Ester Boland was seen and examined. History and physical has been reviewed. The patient has been examined. There have been no significant clinical changes since the completion of the originally dated History and Physical. 
Patient identified by surgeon; surgical site was confirmed by patient and surgeon.  
 
Signed By: Bethany Gonzalez MD   
 January 21, 2019 7:42 AM

## 2019-01-22 VITALS
RESPIRATION RATE: 17 BRPM | HEART RATE: 105 BPM | HEIGHT: 67 IN | DIASTOLIC BLOOD PRESSURE: 67 MMHG | WEIGHT: 293 LBS | SYSTOLIC BLOOD PRESSURE: 151 MMHG | OXYGEN SATURATION: 93 % | BODY MASS INDEX: 45.99 KG/M2 | TEMPERATURE: 98.7 F

## 2019-01-22 LAB
GLUCOSE BLD STRIP.AUTO-MCNC: 206 MG/DL (ref 70–110)
GLUCOSE BLD STRIP.AUTO-MCNC: 259 MG/DL (ref 70–110)
GLUCOSE BLD STRIP.AUTO-MCNC: 302 MG/DL (ref 70–110)
HGB BLD-MCNC: 10.6 G/DL (ref 12–16)

## 2019-01-22 PROCEDURE — 97116 GAIT TRAINING THERAPY: CPT

## 2019-01-22 PROCEDURE — 74011250637 HC RX REV CODE- 250/637: Performed by: ORTHOPAEDIC SURGERY

## 2019-01-22 PROCEDURE — 74011250636 HC RX REV CODE- 250/636: Performed by: ORTHOPAEDIC SURGERY

## 2019-01-22 PROCEDURE — 74011636637 HC RX REV CODE- 636/637: Performed by: ORTHOPAEDIC SURGERY

## 2019-01-22 PROCEDURE — 36415 COLL VENOUS BLD VENIPUNCTURE: CPT

## 2019-01-22 PROCEDURE — 85018 HEMOGLOBIN: CPT

## 2019-01-22 PROCEDURE — 97110 THERAPEUTIC EXERCISES: CPT

## 2019-01-22 PROCEDURE — 77030037875 HC DRSG MEPILEX <16IN BORD MOLN -A

## 2019-01-22 PROCEDURE — 82962 GLUCOSE BLOOD TEST: CPT

## 2019-01-22 PROCEDURE — 77030018846 HC SOL IRR STRL H20 ICUM -A

## 2019-01-22 RX ORDER — OXYCODONE AND ACETAMINOPHEN 5; 325 MG/1; MG/1
1 TABLET ORAL
Qty: 60 TAB | Refills: 0 | Status: SHIPPED
Start: 2019-01-22

## 2019-01-22 RX ORDER — INSULIN LISPRO 100 [IU]/ML
5 INJECTION, SOLUTION INTRAVENOUS; SUBCUTANEOUS
Status: DISCONTINUED | OUTPATIENT
Start: 2019-01-22 | End: 2019-01-22 | Stop reason: HOSPADM

## 2019-01-22 RX ADMIN — GLIPIZIDE 5 MG: 5 TABLET ORAL at 08:58

## 2019-01-22 RX ADMIN — LISINOPRIL 20 MG: 20 TABLET ORAL at 08:58

## 2019-01-22 RX ADMIN — Medication 10 ML: at 14:25

## 2019-01-22 RX ADMIN — LEVOTHYROXINE SODIUM 200 MCG: 100 TABLET ORAL at 06:39

## 2019-01-22 RX ADMIN — ALLOPURINOL 100 MG: 100 TABLET ORAL at 08:58

## 2019-01-22 RX ADMIN — INSULIN LISPRO 12 UNITS: 100 INJECTION, SOLUTION INTRAVENOUS; SUBCUTANEOUS at 12:26

## 2019-01-22 RX ADMIN — FUROSEMIDE 20 MG: 20 TABLET ORAL at 08:58

## 2019-01-22 RX ADMIN — CALCIUM CARBONATE-VITAMIN D TAB 500 MG-200 UNIT 1 TABLET: 500-200 TAB at 08:58

## 2019-01-22 RX ADMIN — ASPIRIN 81 MG: 81 TABLET ORAL at 08:58

## 2019-01-22 RX ADMIN — INSULIN LISPRO 5 UNITS: 100 INJECTION, SOLUTION INTRAVENOUS; SUBCUTANEOUS at 12:27

## 2019-01-22 RX ADMIN — INSULIN LISPRO 4 UNITS: 100 INJECTION, SOLUTION INTRAVENOUS; SUBCUTANEOUS at 07:50

## 2019-01-22 NOTE — ROUTINE PROCESS
Discharge instructions reviewed with the patient. Patient verbalized understanding. All questions answered. IV discontinued, no redness, swelling or pain noted. Patient awaiting son for transportation home, with an ETA of 20 min. Patient armband removed and shredded

## 2019-01-22 NOTE — PROGRESS NOTES
Bedside and Verbal shift change report given to Pauline Hicks RN by Marilyn Burgos RN. Report included the following information SBAR, Kardex, OR Summary, Intake/Output and MAR.

## 2019-01-22 NOTE — PROGRESS NOTES
Patient: Emir Govea               Sex: female          DOA: 1/21/2019 YOB: 1950      Age:  77 y.o.        LOS:  LOS: 1 day Subjective:  
 
Recovering from the reverse total shoulder arthroplasty. Wound OK, NV intact. X-rays show good alignment. HGB 10.6 Active Problems: DJD of left shoulder (1/21/2019) Set for d/c. Office appt in one week

## 2019-01-22 NOTE — PROGRESS NOTES
Problem: Mobility Impaired (Adult and Pediatric) Goal: *Acute Goals and Plan of Care (Insert Text) Goals to be addressed in 1-4 days: STG 
1. Rolling L to R to L modified independent for positioning. 2. Supine to sit to supine S with HR for meals. 3. Sit to stand to sit S with RW in prep for ambulation. LTG 1. Ambulate >150ft S with RW, WBAT, for home/community mobility. 2. Ascend/descend a >6 stair steps CGA with HR for home entry. 3. Tolerate up in chair 1-2 hours for ADLs. 4. Patient/family to be independent with HEP for follow-up care and safe discharge. Outcome: Resolved/Met Date Met: 01/22/19 
physical Therapy TREATMENT/DISCHARGE Patient: Rodriguez Wood [de-identified]77 y.o. female) Date: 1/22/2019 Diagnosis: LEFT SHOULDER DEGENERATIVE JOINT DISEASE 
DJD of left shoulder <principal problem not specified> Procedure(s) (LRB): 
EX[employer]ECH REVERSE TOTAL LEFT SHOULDER ARTHROPLASTY GENERAL ANESTHESIA W/SCALENE BLOCK PRN (Left) 1 Day Post-Op Precautions: Fall Chart, physical therapy assessment, plan of care and goals were reviewed. ASSESSMENT: 
Patient has met short and long term goals at this time. Patient performed sit to/from stand with supervision, ambulated 150 ft without AD, GB applied, sling donned, and ascended/descended 6 stairs with R handrails and supervision. Pt has been educated on seated and supine therex to be performed 3x/day at home to improve strength and ROM. Pt has met inpatient physical therapy goals at this time and home health physical therapy is recommended upon discharge from hospital. 
Progression toward goals: 
[x]      Goals met 
[]      Improving appropriately and progressing toward goals 
[]      Improving slowly and progressing toward goals 
[]      Not making progress toward goals and plan of care will be adjusted PLAN: 
Patient will be discharged from physical therapy at this time. Rationale for discharge: 
[x] Goals Achieved 
[] Plateau Reached [] Patient not participating in therapy 
[] Other: 
Discharge Recommendations:  Home Health and Outpatient Further Equipment Recommendations for Discharge:  N/A  
 
SUBJECTIVE:  
Patient stated I am doing ok.  OBJECTIVE DATA SUMMARY:  
Critical Behavior: 
Neurologic State: Alert, Appropriate for age Orientation Level: Oriented X4 Cognition: Appropriate for age attention/concentration Functional Mobility Training: 
Bed Mobility: 
Supine to Sit: Supervision; Additional time Sit to Supine: Supervision Transfers: 
Sit to Stand: Supervision Stand to Sit: Supervision Balance: 
Sitting: Intact Standing: Intact; Without supportAmbulation/Gait Training: 
Distance (ft): 150 Feet (ft) Assistive Device: Gait belt Ambulation - Level of Assistance: Supervision Gait Abnormalities: Decreased step clearance Base of Support: Widened Stance: Weight shift Speed/Ewa: Slow Step Length: Right shortened;Left shortened Stairs: 
Number of Stairs Trained: 6 Stairs - Level of Assistance: Contact guard assistance Rail Use: Right Therapeutic Exercises:  
Hand, wrist, elbow AROM, shoulder AAROM Pain: 
Pain Scale 1: Numeric (0 - 10) Pain Intensity 1: 2 Activity Tolerance:  
Good Please refer to the flowsheet for vital signs taken during this treatment. After treatment:  
[] Patient left in no apparent distress sitting up in chair 
[x] Patient left in no apparent distress in bed 
[x] Call bell left within reach [x] Nursing notified 
[] Caregiver present 
[] Bed alarm activated Tamiko Pavon Time Calculation: 29 mins

## 2019-01-22 NOTE — DISCHARGE SUMMARY
Patient: Ulises Roberts               Sex: female          DOA: 1/21/2019         YOB: 1950      Age:  77 y.o.        LOS:  LOS: 1 day        HPI:     Ulises Roberts is a 77 y.o. female who complained of pain and weakness in the left shoulder, unresponsive to NSAID's and interfering with daily activities including sleep. X-rays showed severe gleno-humeral degenerative arthritis and an MRI shows a massive, unrepairable rotator cuff tear. The patient was then admitted for a left reverse total  shoulder arthroplasty. The surgery was then performed on 6/36/0120  with no complications. In the post op period good progress was made with stable vital signs. The neurological exam in the operative arm was normal. Ulises Roberts was cleared for discharge home with the plan to be seen in the office in one week.

## 2019-01-22 NOTE — PROGRESS NOTES
Patient was visited by [facility]. Volunteer conducted a Spiritual Care Screening and reported no needs to this . Spiritual Care literature was provided. Chaplains will continue to follow and will provide pastoral care as needed or requested. [address] [facility] Talavera M.Div. Board Certified [employer] [phone] - Office

## 2019-01-22 NOTE — PROGRESS NOTES
Transition of Care [facility]) Plan:    Physician follow up 1/22/19 Pt admitted for an elective surgical procedure (left Reverse Total Shoulder arthroplasty exactech). Pt is independent. Please encourage ambulation. No plan of care needs identified. Anticipate pt will be medically stable for discharge within the next 24-48 hours. CM available to assist as needed. 1040: 
CM met with pt at bedside to discuss transition of care. Pt lives with her son and his family and they will assist as needed. Pt has confirmed she has a RW, cane and c-pap at home. Pt has been ambulating in the halls with therapy. No transition of care needs have been identified. Pt's family will transport pt home today. EDUARDO Transportation: How is patient being transported at discharge? Family/Friend When? Once cleared by physician Is transport scheduled? N/A Follow-up appointment and transportation: PCP/Specialist?  See AVS for Appointment Who is transporting to the follow-up appointment? Self/Family/Friend Is transport for follow up appointment scheduled? N/A Communication plan (with patient/family): Who is being called? Patient or Next of Kin? Responsible party? Patient What number(s) is to be used? See [employer] What service provider is calling for [facility] services? When are they calling? Readmission Risk? (Green/Low; Yellow/Moderate; Red/High): Mod  
 
Care Management Interventions PCP Verified by CM: Yes Mode of Transport at Discharge: Other (see comment)(Family) Transition of Care Consult (CM Consult): Discharge Planning Health Maintenance Reviewed: Yes Physical Therapy Consult: Yes Occupational Therapy Consult: Yes Current Support Network: Shruthi Family Lives [place] Confirm Follow Up Transport: Family Plan discussed with Pt/Family/Caregiver: Yes Discharge Location Discharge Placement: Home with family assistance

## 2019-01-22 NOTE — DIABETES MGMT
GLYCEMIC CONTROL PROGRESS NOTE: 
 
-known h/o T2DM,HbA1c not within recommended range for age + comorbids on basal + GLP1 + oral home regimen 
-BG out of target range non-ICU: < 140 mg/dL 
-TDD = 50 (35 Lantus + 15 units - Humalog Normal Insulin Sensitivity Corrective Coverage) -both FBG & PPG out of target range, recommend *Humalog 5 units qac *- Humalog Very Insulin Resistant Corrective Coverage (orders entered per protocol) -RN on floor notified Recent Glucose Results:  
Lab Results Component Value Date/Time GLUCPOC 206 (H) 01/22/2019 06:31 AM  
 GLUCPOC 287 (H) 01/21/2019 09:51 PM  
 GLUCPOC 171 (H) 01/21/2019 05:01 PM  
 
Tanvir Morel MS, RN, CDE Glycemic Control Team 
[phone] Pager [phone] (M-TH 8:00-4:30P) *After Hours pager [phone]

## 2019-01-22 NOTE — PROGRESS NOTES
0735 
Pt is awake, alert, oriented x4. Sitting on chair. mepilex dressing to left shoulder sry and intact. On PCA for pain control. Pt able to move left arm/hand and with + radial pulse and slight  numbness  to L shoulder. Pt wearing an arm sling to left arm.  
9:04 AM Lying in bed. PCA discontinued as ordered . Pain level 2/10. Lungs are clear. Abdomen soft, obese , with hypoactive BS.   
10:50 AM  
KAREEN Zavala suggested to add Humalog 5 units with each meal .  Dr Fatou Carlos in to see pt. Dr Fatou Carlos was told of Diabetic educator's recommendation and  Verbal order was given by Dr Fatou Carlos. Pt for discharge today. 80 Pt was seen by PT. Pt ambulated in hallway and stairs. Pt was cleared by PT for discharge. [address] [place] [address] Pt's Accucheck is 302. Pt given 12 units Humalog sq + 5 units humalog sq with meals. Pt sat at edge of bed for lunch. Will recheck  Accucheck  after 2 hrs.  
1450 Pt's accucheck  259.   
1550 Discharge instructions given by Kitty Zhao RN. Pt discharged per wheelchair accompanied by son.

## 2019-01-22 NOTE — DISCHARGE INSTRUCTIONS
Patient Education        Shoulder Replacement Surgery: What to Expect at Home  Your Recovery    Shoulder replacement surgery replaces the worn parts of your shoulder joint. When you leave the hospital, your arm will be in a sling. It will be helpful if there is someone to help you at home for the next few weeks or until you have more energy and can move around better. You will go home with a bandage and tissue glue, or tape strips. You can remove the bandage when your doctor tells you to. Glue or tape strips will fall off on their own over time. You may still have some mild pain, and the area may be swollen for several months after surgery. Your doctor will give you medicine for the pain. You will continue the rehabilitation program (rehab) you started in the hospital. The better you do with your rehab exercises, the sooner you will get your strength and movement back. Depending on your job, you may be able to return to work as early as 2 to 3 weeks after surgery, as long as you avoid certain arm movements, such as lifting. This care sheet gives you a general idea about how long it will take for you to recover. But each person recovers at a different pace. Follow the steps below to get better as quickly as possible. How can you care for yourself at home? Activity    · Rest when you feel tired. You may take a nap, but don't stay in bed all day.     · Work with your physical therapist to learn the best way to exercise.     · You will have a sling to wear at night.  And it's a good idea to also put a small stack of folded sheets or towels under your upper arm while you are in bed to keep your arm from dropping too far back.     · Your arm should stay next to your body or in front of it for several weeks, both while you are up and during sleep.     · Don't lift anything with the affected arm for 6 weeks.     · No driving until you are cleared by your surgeon at follow-up appointment.      · Ask your doctor when it is okay for you to have sex.     · Your doctor may advise you to give up activities that put stress on that shoulder. This includes sports such as weight lifting or tennis, unless your tennis arm was not the one affected. Diet    · By the time you leave the hospital, you will probably be eating your normal diet. If your stomach is upset, try bland, low-fat foods like plain rice, broiled chicken, toast, and yogurt. Your doctor may recommend that you take iron and vitamin supplements.     · Drink plenty of fluids (unless your doctor tells you not to).     · You may notice that your bowel movements are not regular right after your surgery. This is common. Try to avoid constipation and straining with bowel movements. You may want to take a fiber supplement every day. If you have not had a bowel movement after a couple of days, ask your doctor about taking a mild laxative. Medicines    · Your doctor will tell you if and when you can restart your medicines. He or she will also give you instructions about taking any new medicines.     · If you take blood thinners, such as warfarin (Coumadin), clopidogrel (Plavix), or aspirin, be sure to talk to your doctor. He or she will tell you if and when to start taking those medicines again. Make sure that you understand exactly what your doctor wants you to do.     · Be safe with medicines. Take pain medicines exactly as directed. ? If the doctor gave you a prescription medicine for pain, take it as prescribed. ? If you are not taking a prescription pain medicine, ask your doctor if you can take an over-the-counter medicine.     · If you think your pain medicine is making you sick to your stomach:  ? Take your medicine after meals (unless your doctor has told you not to). ? Ask your doctor for a different pain medicine.     · If your doctor prescribed antibiotics, take them as directed. Don't stop taking them just because you feel better.  You need to take the full course of antibiotics.     · If you take a blood thinner, be sure you get instructions about how to take your medicine safely. Blood thinners can cause serious bleeding problems. Incision care    · If your doctor told you how to care for your cut (incision), follow your doctor's instructions. You will have a dressing over the cut. A dressing helps the incision heal and protects it. Your doctor will tell you how to take care of this.     · If you did not get instructions, follow this general advice:  ? If you have strips of tape on the cut the doctor made, leave the tape on for a week or until it falls off.  ? If you have skin adhesive on the cut, leave it on until it falls off. Skin adhesive is also called glue or liquid stitches. ? Keep dressing clean, dry, and intact until follow-up appointment. ? May sponge bathe. Exercise    · Shoulder rehabilitation is a series of exercises you do after your surgery. This helps you get back your shoulder's range of motion and strength. You will work with your doctor and physical therapist to plan this exercise program. To get the best results, you need to do the exercises correctly and as often and as long as your doctor tells you.    Ice    · For pain, put ice or a cold pack on the area for 10 to 20 minutes at a time. Put a thin cloth between the ice and your skin. Follow-up care is a key part of your treatment and safety. Be sure to make and go to all appointments, and call your doctor if you are having problems. It's also a good idea to know your test results and keep a list of the medicines you take. When should you call for help? Call 911 anytime you think you may need emergency care. For example, call if:    · You have severe trouble breathing.     · You have symptoms of a blood clot in your lung (called a pulmonary embolism). These may include:  ? Sudden chest pain. ? Trouble breathing. ?  Coughing up blood.    Call your doctor now or seek immediate medical care if:    · You have severe or increasing pain.     · You have symptoms of infection, such as:  ? Increased pain, swelling, warmth, or redness. ? Red streaks or pus. ? A fever.     · You have tingling, weakness, or numbness in your arm.     · Your arm turns cold or changes color.     · You have symptoms of a blood clot in your leg (called a deep vein thrombosis). These may include:  ? Pain in the calf, back of the knee, thigh, or groin. ? Redness and swelling in the leg or groin.    Watch closely for changes in your health, and be sure to contact your doctor if:    · You do not get better as expected. Where can you learn more? Go to http://jen-basil.info/. Enter V508 in the search box to learn more about \"Shoulder Replacement Surgery: What to Expect at Home. \"  Current as of: September 20, 2018  Content Version: 11.9  © 4722-1023 [employer]. Care instructions adapted under license by [employer] (which disclaims liability or warranty for this information). If you have questions about a medical condition or this instruction, always ask your healthcare professional. [place] [address] any warranty or liability for your use of this information. DISCHARGE SUMMARY from Nurse    PATIENT INSTRUCTIONS:    After general anesthesia or intravenous sedation, for 24 hours or while taking prescription Narcotics:  · Limit your activities  · Do not drive and operate hazardous machinery  · Do not make important personal or business decisions  · Do  not drink alcoholic beverages  · If you have not urinated within 8 hours after discharge, please contact your surgeon on call.     Report the following to your surgeon:  · Excessive pain, swelling, redness or odor of or around the surgical area  · Temperature over 100.5  · Nausea and vomiting lasting longer than 4 hours or if unable to take medications  · Any signs of decreased circulation or nerve impairment to extremity: change in color, persistent  numbness, tingling, coldness or increase pain  · Any questions    What to do at Home:  Recommended activity: Activity as tolerated and no driving until cleared by surgeon; No heavy lifting, pushing, or pulling; and PT/OT per Home Health    Wear compression stockings for 2 weeks to help prevent blood clots; may remove at bedtime. *  Please give a list of your current medications to your Primary Care Provider. *  Please update this list whenever your medications are discontinued, doses are      changed, or new medications (including over-the-counter products) are added. *  Please carry medication information at all times in case of emergency situations. These are general instructions for a healthy lifestyle:    No smoking/ No tobacco products/ Avoid exposure to second hand smoke  Surgeon General's Warning:  Quitting smoking now greatly reduces serious risk to your health. Obesity, smoking, and sedentary lifestyle greatly increases your risk for illness    A healthy diet, regular physical exercise & weight monitoring are important for maintaining a healthy lifestyle    You may be retaining fluid if you have a history of heart failure or if you experience any of the following symptoms:  Weight gain of 3 pounds or more overnight or 5 pounds in a week, increased swelling in our hands or feet or shortness of breath while lying flat in bed. Please call your doctor as soon as you notice any of these symptoms; do not wait until your next office visit. Recognize signs and symptoms of STROKE:    F-face looks uneven    A-arms unable to move or move unevenly    S-speech slurred or non-existent    T-time-call 911 as soon as signs and symptoms begin-DO NOT go       Back to bed or wait to see if you get better-TIME IS BRAIN. Warning Signs of HEART ATTACK     Call 911 if you have these symptoms:   Chest discomfort.  Most heart attacks involve discomfort in the center of the chest that lasts more than a few minutes, or that goes away and comes back. It can feel like uncomfortable pressure, squeezing, fullness, or pain.  Discomfort in other areas of the upper body. Symptoms can include pain or discomfort in one or both arms, the back, neck, jaw, or stomach.  Shortness of breath with or without chest discomfort.  Other signs may include breaking out in a cold sweat, nausea, or lightheadedness. Don't wait more than five minutes to call 911 - MINUTES MATTER! Fast action can save your life. Calling 911 is almost always the fastest way to get lifesaving treatment. Emergency Medical Services staff can begin treatment when they arrive -- up to an hour sooner than if someone gets to the hospital by car. The discharge information has been reviewed with the patient. The patient verbalized understanding. Discharge medications reviewed with the patient and appropriate educational materials and side effects teaching were provided.   ___________________________________________________________________________________________________________________________________

## 2019-01-22 NOTE — ROUTINE PROCESS
1940 
Bedside and Verbal shift change report given to Navarro Snow RN (oncoming nurse) by Maryann Caballero RN (offgoing nurse). Report included the following information SBAR, Sascha and MAR.

## 2019-09-20 NOTE — PROGRESS NOTES
Patient is mildly tachy in pacu. EBL was 1100 so a hb was checked. Repeat post op level was 9. To make up for intra op blood loss and all other losses. We have hydrated carefully. Remains 107 but bp is very stable. Hospitalist has been consulted. Initial (On Arrival)

## 2025-06-03 NOTE — ANESTHESIA PREPROCEDURE EVALUATION
Anesthetic History   No history of anesthetic complications            Review of Systems / Medical History  Patient summary reviewed, nursing notes reviewed and pertinent labs reviewed    Pulmonary        Sleep apnea: CPAP           Neuro/Psych   Within defined limits           Cardiovascular    Hypertension              Exercise tolerance: >4 METS     GI/Hepatic/Renal           Liver disease     Endo/Other    Diabetes  Hypothyroidism  Arthritis     Other Findings              Physical Exam    Airway  Mallampati: III  TM Distance: 4 - 6 cm  Neck ROM: decreased range of motion   Mouth opening: Normal     Cardiovascular  Regular rate and rhythm,  S1 and S2 normal,  no murmur, click, rub, or gallop  Rhythm: regular  Rate: normal         Dental    Dentition: Edentulous     Pulmonary  Breath sounds clear to auscultation               Abdominal  GI exam deferred       Other Findings            Anesthetic Plan    ASA: 3  Anesthesia type: general          Induction: Intravenous  Anesthetic plan and risks discussed with: Patient and Family Give tylenol or ibuprofen as directed for pain and fever. Maintain oral hydration with water and a healthy diet.    Thank you for coming to our Emergency Department today. It is important to remember that some problems or medical conditions are difficult to diagnose and may not be found or addressed during your Emergency Department visit.  These conditions often start with non-specific symptoms and can only be diagnosed on follow up visits with your primary care physician or specialist when the symptoms continue or change. Please remember that all medical conditions can change, and we cannot predict how you will be feeling tomorrow or the next day. Return to the ER with any questions/concerns, new/concerning symptoms, worsening or failure to improve.       Be sure to follow up with your primary care doctor and review all labs/imaging/tests that were performed during your ER visit with them. It is very common for us to identify non-emergent incidental findings which must be followed up with your primary care physician.  Some labs/imaging/tests may be outside of the normal range, and require non-emergent follow-up and/or further investigation/treatment/procedures/testing to help diagnose/exclude/prevent complications or other potentially serious medical conditions. Some abnormalities may not have been discussed or addressed during your ER visit.     An ER visit does not replace a primary care visit, and many screening tests or follow-up tests cannot be ordered by an ER doctor or performed by the ER. Some tests may even require pre-approval.    If you do not have a primary care doctor, you may contact the one listed on your discharge paperwork or you may also call the [facility] Appointment Desk at [phone] , or [address] [place] [address] at  [phone] to schedule an appointment, or establish care with a primary care doctor or even a specialist and to obtain information about local resources. It is important to your  health that you have a primary care doctor.    Please take all medications as directed. We have done our best to select a medication for you that will treat your condition however, all medications may potentially have side-effects and it is impossible to predict which medications may give you side-effects or what those side-effects (if any) those medications may give you.  If you feel that you are having a negative effect or side-effect of any medication you should stop taking those medications immediately and seek medical attention. If you feel that you are having a life-threatening reaction call 911.        Do not drive, swim, climb to height, take a bath, operate heavy machinery, drink alcohol or take potentially sedating medications, sign any legal documents or make any important decisions for 24 hours if you have received any pain medications, sedatives or mood altering drugs during your ER visit or within 24 hours of taking them if they have been prescribed to you.     You can find additional resources for Dentists, hearing aids, durable medical equipment, low cost pharmacies and other resources at [email or web address]

## (undated) DEVICE — SOL IRR STRL H2O 1500ML BTL --

## (undated) DEVICE — KENDALL SCD EXPRESS SLEEVES, KNEE LENGTH, MEDIUM: Brand: KENDALL SCD

## (undated) DEVICE — BLADE SAW 1.27X90 MM FOR LG BNE [KMS2513PM62STE] [KOMET MEDICAL]

## (undated) DEVICE — NEEDLE HYPO 21GA L1.5IN INTRAMUSCULAR S STL LATCH BVL UP

## (undated) DEVICE — REM POLYHESIVE ADULT PATIENT RETURN ELECTRODE: Brand: VALLEYLAB

## (undated) DEVICE — SYRINGE MED 20ML STD CLR PLAS LUERLOCK TIP N CTRL DISP

## (undated) DEVICE — SOLUTION LACTATED RINGERS INJECTION USP

## (undated) DEVICE — SUTURE STRATAFIX SZ 3-0 L30CM NONABSORBABLE UD L19MM FS-2 SXMP2B408

## (undated) DEVICE — STERILE POLYISOPRENE POWDER-FREE SURGICAL GLOVES WITH EMOLLIENT COATING: Brand: PROTEXIS

## (undated) DEVICE — (D)PREP SKN CHLRAPRP APPL 26ML -- CONVERT TO ITEM 371833

## (undated) DEVICE — WOUND RETRACTOR AND PROTECTOR: Brand: ALEXIS O WOUND PROTECTOR-RETRACTOR

## (undated) DEVICE — 3M™ STERI-DRAPE™ U-DRAPE 1015: Brand: STERI-DRAPE™

## (undated) DEVICE — SUTURE FIBERWIRE SZ 2 L38IN 97CM NONABSB BLU L26.5MM W/TWO TAPERED NEEDLES  1/2 CIRCLE AR7205

## (undated) DEVICE — BIPOLAR SEALER 23-301-1 AQM MBS: Brand: AQUAMANTYS™

## (undated) DEVICE — SKIN MARKER,REGULAR TIP WITH RULER AND LABELS: Brand: DEVON

## (undated) DEVICE — SINGLE PORT MANIFOLD: Brand: NEPTUNE 2

## (undated) DEVICE — DRESSING FOAM SELF ADH 20X10 CM ABSORBENT MEPILEX BORDER

## (undated) DEVICE — SUTURE VCRL 0 L27IN ABSRB OS-6 BRAID COAT UD J534H

## (undated) DEVICE — BLADE SAW 1.27X13X90 MM FOR LG BNE

## (undated) DEVICE — HANDPIECE SET WITH FAN SPRAY TIP: Brand: INTERPULSE

## (undated) DEVICE — ADHESIVE TISS CLOSURE 22X4 CM 4 CC HI VISC EXOFIN

## (undated) DEVICE — Z DISCONTINUED USE (MFG CAT 7984-37) SOLUTION IV SODIUM CHL 0.9% 100 ML INJ

## (undated) DEVICE — STERILE POLYISOPRENE POWDER-FREE SURGICAL GLOVES: Brand: PROTEXIS

## (undated) DEVICE — GOWN,SIRUS,NONRNF,SETINSLV,XL,20/CS: Brand: MEDLINE

## (undated) DEVICE — SUTURE FIBERWIRE SZ 2 W/ TAPERED NEEDLE BLUE L38IN NONABSORB BLU L26.5MM 1/2 CIRCLE AR7200

## (undated) DEVICE — SUT VCRL + 2-0 36IN CT1 UD --

## (undated) DEVICE — PACK PROCEDURE SURG TOT SHLDR CUST

## (undated) DEVICE — FEMORAL CANAL TIP

## (undated) DEVICE — Device

## (undated) DEVICE — SOLUTION IV 500ML 0.9% SOD CHL FLX CONT

## (undated) DEVICE — SUTURE VCRL 2-0 L27IN ABSRB UD OS-6 L36MM 1/2 CIR REV CUT J533H

## (undated) DEVICE — PACK PROCEDURE SURG ANTR HIP

## (undated) DEVICE — DEVON™ KNEE AND BODY STRAP 60" X 3" (1.5 M X 7.6 CM): Brand: DEVON

## (undated) DEVICE — STAPLER SKIN 10.2X3.25MM OPN 4.8X3.4MM CLSR 0.51MM G S STL

## (undated) DEVICE — CATHETERIZATION TRAY 16 FR FOL DRAINAGE BG LUBRI-SIL IC

## (undated) DEVICE — ASTOUND STANDARD SURGICAL GOWN, XXL: Brand: CONVERTORS

## (undated) DEVICE — SOL IRRIGATION INJ NACL 0.9% 500ML BTL

## (undated) DEVICE — PLUS HANDPIECE WITH SPRAY TIP: Brand: SURGILAV